# Patient Record
Sex: MALE | Race: WHITE | Employment: FULL TIME | URBAN - METROPOLITAN AREA
[De-identification: names, ages, dates, MRNs, and addresses within clinical notes are randomized per-mention and may not be internally consistent; named-entity substitution may affect disease eponyms.]

---

## 2021-12-17 ENCOUNTER — HOSPITAL ENCOUNTER (EMERGENCY)
Age: 36
Discharge: HOME | End: 2021-12-17
Attending: EMERGENCY MEDICINE
Payer: COMMERCIAL

## 2021-12-17 VITALS
TEMPERATURE: 98.3 F | WEIGHT: 220 LBS | RESPIRATION RATE: 18 BRPM | HEART RATE: 80 BPM | SYSTOLIC BLOOD PRESSURE: 123 MMHG | BODY MASS INDEX: 30.8 KG/M2 | HEIGHT: 71 IN | OXYGEN SATURATION: 100 % | DIASTOLIC BLOOD PRESSURE: 75 MMHG

## 2021-12-17 DIAGNOSIS — I47.1 SVT (SUPRAVENTRICULAR TACHYCARDIA) (HCC): Primary | ICD-10-CM

## 2021-12-17 LAB
ANION GAP SERPL CALCULATED.3IONS-SCNC: 12 MMOL/L (ref 7–16)
BASOPHILS ABSOLUTE: 0.03 E9/L (ref 0–0.2)
BASOPHILS RELATIVE PERCENT: 0.4 % (ref 0–2)
BUN BLDV-MCNC: 24 MG/DL (ref 6–20)
CALCIUM SERPL-MCNC: 9.4 MG/DL (ref 8.6–10.2)
CHLORIDE BLD-SCNC: 104 MMOL/L (ref 98–107)
CO2: 23 MMOL/L (ref 22–29)
CREAT SERPL-MCNC: 1.1 MG/DL (ref 0.7–1.2)
EKG ATRIAL RATE: 182 BPM
EKG Q-T INTERVAL: 252 MS
EKG QRS DURATION: 86 MS
EKG QTC CALCULATION (BAZETT): 441 MS
EKG R AXIS: 59 DEGREES
EKG T AXIS: 32 DEGREES
EKG VENTRICULAR RATE: 184 BPM
EOSINOPHILS ABSOLUTE: 0.11 E9/L (ref 0.05–0.5)
EOSINOPHILS RELATIVE PERCENT: 1.6 % (ref 0–6)
GFR AFRICAN AMERICAN: >60
GFR NON-AFRICAN AMERICAN: >60 ML/MIN/1.73
GLUCOSE BLD-MCNC: 133 MG/DL (ref 74–99)
HCT VFR BLD CALC: 50.3 % (ref 37–54)
HEMOGLOBIN: 17.6 G/DL (ref 12.5–16.5)
IMMATURE GRANULOCYTES #: 0.01 E9/L
IMMATURE GRANULOCYTES %: 0.1 % (ref 0–5)
LYMPHOCYTES ABSOLUTE: 2.02 E9/L (ref 1.5–4)
LYMPHOCYTES RELATIVE PERCENT: 28.6 % (ref 20–42)
MCH RBC QN AUTO: 30.2 PG (ref 26–35)
MCHC RBC AUTO-ENTMCNC: 35 % (ref 32–34.5)
MCV RBC AUTO: 86.4 FL (ref 80–99.9)
MONOCYTES ABSOLUTE: 0.45 E9/L (ref 0.1–0.95)
MONOCYTES RELATIVE PERCENT: 6.4 % (ref 2–12)
NEUTROPHILS ABSOLUTE: 4.45 E9/L (ref 1.8–7.3)
NEUTROPHILS RELATIVE PERCENT: 62.9 % (ref 43–80)
PDW BLD-RTO: 11.5 FL (ref 11.5–15)
PLATELET # BLD: 373 E9/L (ref 130–450)
PMV BLD AUTO: 9.5 FL (ref 7–12)
POTASSIUM REFLEX MAGNESIUM: 4.6 MMOL/L (ref 3.5–5)
RBC # BLD: 5.82 E12/L (ref 3.8–5.8)
SODIUM BLD-SCNC: 139 MMOL/L (ref 132–146)
TSH SERPL DL<=0.05 MIU/L-ACNC: 0.68 UIU/ML (ref 0.27–4.2)
WBC # BLD: 7.1 E9/L (ref 4.5–11.5)

## 2021-12-17 PROCEDURE — 80048 BASIC METABOLIC PNL TOTAL CA: CPT

## 2021-12-17 PROCEDURE — 96374 THER/PROPH/DIAG INJ IV PUSH: CPT

## 2021-12-17 PROCEDURE — 84443 ASSAY THYROID STIM HORMONE: CPT

## 2021-12-17 PROCEDURE — 96368 THER/DIAG CONCURRENT INF: CPT

## 2021-12-17 PROCEDURE — 96375 TX/PRO/DX INJ NEW DRUG ADDON: CPT

## 2021-12-17 PROCEDURE — 93010 ELECTROCARDIOGRAM REPORT: CPT | Performed by: INTERNAL MEDICINE

## 2021-12-17 PROCEDURE — 99284 EMERGENCY DEPT VISIT MOD MDM: CPT

## 2021-12-17 PROCEDURE — 6360000002 HC RX W HCPCS

## 2021-12-17 PROCEDURE — 93005 ELECTROCARDIOGRAM TRACING: CPT | Performed by: EMERGENCY MEDICINE

## 2021-12-17 PROCEDURE — 85025 COMPLETE CBC W/AUTO DIFF WBC: CPT

## 2021-12-17 PROCEDURE — 2500000003 HC RX 250 WO HCPCS

## 2021-12-17 RX ORDER — METOPROLOL TARTRATE 5 MG/5ML
2.5 INJECTION INTRAVENOUS ONCE
Status: COMPLETED | OUTPATIENT
Start: 2021-12-17 | End: 2021-12-17

## 2021-12-17 RX ORDER — ADENOSINE 3 MG/ML
12 INJECTION, SOLUTION INTRAVENOUS ONCE
Status: COMPLETED | OUTPATIENT
Start: 2021-12-17 | End: 2021-12-17

## 2021-12-17 RX ORDER — ADENOSINE 3 MG/ML
INJECTION, SOLUTION INTRAVENOUS
Status: COMPLETED
Start: 2021-12-17 | End: 2021-12-17

## 2021-12-17 RX ADMIN — METOPROLOL TARTRATE 2.5 MG: 1 INJECTION, SOLUTION INTRAVENOUS at 11:52

## 2021-12-17 RX ADMIN — ADENOSINE 12 MG: 3 INJECTION, SOLUTION INTRAVENOUS at 11:16

## 2021-12-17 RX ADMIN — ADENOSINE 12 MG: 3 INJECTION INTRAVENOUS at 11:16

## 2021-12-17 ASSESSMENT — ENCOUNTER SYMPTOMS
VOMITING: 0
EYE ITCHING: 0
EYE REDNESS: 0
CONSTIPATION: 0
TROUBLE SWALLOWING: 0
CHEST TIGHTNESS: 0
RHINORRHEA: 0
DIARRHEA: 0
ABDOMINAL PAIN: 0
BACK PAIN: 0
ABDOMINAL DISTENTION: 0
WHEEZING: 0
NAUSEA: 0
SHORTNESS OF BREATH: 0

## 2021-12-17 NOTE — ED PROVIDER NOTES
Modesto Hutton is a 31 y.o. male    Chief Complaint   Patient presents with    Tachycardia     pt having high HR at home for the past 45 minutes         HPI   Modesto Hutton is a 31 y.o. male presenting to the ED for Tachycardia (pt having high HR at home for the past 45 minutes)    Patient presents from home with palpitations/tachycardia above 180s for the last 45 minutes prior to arrival.  Patient has never had this before and has no significant medical history. No known causative factors. Patient is not currently having any chest pain, shortness of breath, nausea, vomiting, fever. 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 Patient does smoke a pack of cigarettes a day. Symptoms began 45 minutes prior to arrival with severe severity, and is constant no other associated symptoms or modifying factors. History comes primarily from the patient. On arrival, the patient was assessed by history, physical exam, laboratory studies and ekg, vital signs. Vital signs tachycardia but otherwise normal and the patient was afebrile. Review of Systems   Constitutional: Negative for appetite change, fatigue and fever. HENT: Negative for congestion, rhinorrhea and trouble swallowing. Eyes: Negative for redness and itching. Respiratory: Negative for chest tightness, shortness of breath and wheezing. Cardiovascular: Positive for palpitations. Negative for chest pain and leg swelling. Gastrointestinal: Negative for abdominal distention, abdominal pain, constipation, diarrhea, nausea and vomiting. Genitourinary: Negative for decreased urine volume, difficulty urinating and frequency. Musculoskeletal: Negative for arthralgias, back pain and myalgias. Neurological: Negative for dizziness, syncope, weakness, numbness and headaches. Psychiatric/Behavioral: Negative for agitation, behavioral problems, confusion and decreased concentration.  The patient is not nervous/anxious. All other systems reviewed and are negative. Physical Exam  Vitals reviewed. Constitutional:       General: He is not in acute distress. Appearance: Normal appearance. He is obese. He is not ill-appearing. HENT:      Head: Normocephalic and atraumatic. Right Ear: External ear normal.      Left Ear: External ear normal.      Nose: Nose normal. No congestion or rhinorrhea. Mouth/Throat:      Mouth: Mucous membranes are moist.      Pharynx: Oropharynx is clear. No oropharyngeal exudate or posterior oropharyngeal erythema. Eyes:      Extraocular Movements: Extraocular movements intact. Conjunctiva/sclera: Conjunctivae normal.      Pupils: Pupils are equal, round, and reactive to light. Cardiovascular:      Rate and Rhythm: Regular rhythm. Tachycardia present. Heart sounds: Normal heart sounds. No murmur heard. Pulmonary:      Effort: Pulmonary effort is normal. No respiratory distress. Breath sounds: Normal breath sounds. No wheezing or rhonchi. Abdominal:      General: Abdomen is flat. There is no distension. Tenderness: There is no abdominal tenderness. There is no guarding. Musculoskeletal:         General: No swelling or tenderness. Normal range of motion. Cervical back: Normal range of motion. No rigidity or tenderness. Skin:     General: Skin is warm and dry. Capillary Refill: Capillary refill takes less than 2 seconds. Coloration: Skin is not jaundiced or pale. Findings: No bruising or erythema. Neurological:      General: No focal deficit present. Mental Status: He is alert and oriented to person, place, and time. Cranial Nerves: No cranial nerve deficit. Motor: No weakness. Psychiatric:         Mood and Affect: Mood normal.         Behavior: Behavior normal.         Thought Content:  Thought content normal.          Procedures     MDM   Patient presented to the Emergency Department for orders placed or performed during the hospital encounter of 12/17/21   CBC Auto Differential   Result Value Ref Range    WBC 7.1 4.5 - 11.5 E9/L    RBC 5.82 (H) 3.80 - 5.80 E12/L    Hemoglobin 17.6 (H) 12.5 - 16.5 g/dL    Hematocrit 50.3 37.0 - 54.0 %    MCV 86.4 80.0 - 99.9 fL    MCH 30.2 26.0 - 35.0 pg    MCHC 35.0 (H) 32.0 - 34.5 %    RDW 11.5 11.5 - 15.0 fL    Platelets 146 100 - 451 E9/L    MPV 9.5 7.0 - 12.0 fL    Neutrophils % 62.9 43.0 - 80.0 %    Immature Granulocytes % 0.1 0.0 - 5.0 %    Lymphocytes % 28.6 20.0 - 42.0 %    Monocytes % 6.4 2.0 - 12.0 %    Eosinophils % 1.6 0.0 - 6.0 %    Basophils % 0.4 0.0 - 2.0 %    Neutrophils Absolute 4.45 1.80 - 7.30 E9/L    Immature Granulocytes # 0.01 E9/L    Lymphocytes Absolute 2.02 1.50 - 4.00 E9/L    Monocytes Absolute 0.45 0.10 - 0.95 E9/L    Eosinophils Absolute 0.11 0.05 - 0.50 E9/L    Basophils Absolute 0.03 0.00 - 0.20 P3/T   Basic Metabolic Panel w/ Reflex to MG   Result Value Ref Range    Sodium 139 132 - 146 mmol/L    Potassium reflex Magnesium 4.6 3.5 - 5.0 mmol/L    Chloride 104 98 - 107 mmol/L    CO2 23 22 - 29 mmol/L    Anion Gap 12 7 - 16 mmol/L    Glucose 133 (H) 74 - 99 mg/dL    BUN 24 (H) 6 - 20 mg/dL    CREATININE 1.1 0.7 - 1.2 mg/dL    GFR Non-African American >60 >=60 mL/min/1.73    GFR African American >60     Calcium 9.4 8.6 - 10.2 mg/dL   TSH without Reflex   Result Value Ref Range    TSH 0.678 0.270 - 4.200 uIU/mL   EKG 12 Lead   Result Value Ref Range    Ventricular Rate 184 BPM    Atrial Rate 182 BPM    QRS Duration 86 ms    Q-T Interval 252 ms    QTc Calculation (Bazett) 441 ms    R Axis 59 degrees    T Axis 32 degrees       Radiology:  No orders to display       ------------------------- NURSING NOTES AND VITALS REVIEWED ---------------------------  Date / Time Roomed:  12/17/2021 11:06 AM  ED Bed Assignment:  [id number]    The nursing notes within the ED encounter and vital signs as below have been reviewed.    /75   Pulse 80 Temp 98.3 °F (36.8 °C)   Resp 18   Ht 5' 11\" (1.803 m)   Wt 220 lb (99.8 kg)   SpO2 100%   BMI 30.68 kg/m²   Oxygen Saturation Interpretation: Normal      ------------------------------------------ PROGRESS NOTES ------------------------------------------  4:30 PM EST  I have spoken with the patient and discussed todays results, in addition to providing specific details for the plan of care and counseling regarding the diagnosis and prognosis. Their questions are answered at this time and they are agreeable with the plan. I discussed at length with them reasons for immediate return here for re evaluation. They will followup with their Cardiologist and primary care physician by calling their office tomorrow. --------------------------------- ADDITIONAL PROVIDER NOTES ---------------------------------  At this time the patient is without objective evidence of an acute process requiring hospitalization or inpatient management. They have remained hemodynamically stable throughout their entire ED visit and are stable for discharge with outpatient follow-up. The plan has been discussed in detail and they are aware of the specific conditions for emergent return, as well as the importance of follow-up. There are no discharge medications for this patient. Diagnosis:  1. SVT (supraventricular tachycardia) ([facility])        Disposition:  Patient's disposition: Discharge to home  Patient's condition is stable.          Jessica Wolf MD  Resident  12/17/21 3044

## 2022-03-09 ENCOUNTER — OFFICE VISIT (OUTPATIENT)
Dept: NON INVASIVE DIAGNOSTICS | Age: 37
End: 2022-03-09
Payer: COMMERCIAL

## 2022-03-09 VITALS
HEART RATE: 73 BPM | BODY MASS INDEX: 32.34 KG/M2 | DIASTOLIC BLOOD PRESSURE: 84 MMHG | RESPIRATION RATE: 16 BRPM | SYSTOLIC BLOOD PRESSURE: 132 MMHG | WEIGHT: 231 LBS | HEIGHT: 71 IN

## 2022-03-09 DIAGNOSIS — R94.31 EKG ABNORMALITIES: ICD-10-CM

## 2022-03-09 DIAGNOSIS — I47.1 SVT (SUPRAVENTRICULAR TACHYCARDIA) (HCC): Primary | ICD-10-CM

## 2022-03-09 PROCEDURE — G8427 DOCREV CUR MEDS BY ELIG CLIN: HCPCS | Performed by: INTERNAL MEDICINE

## 2022-03-09 PROCEDURE — 93000 ELECTROCARDIOGRAM COMPLETE: CPT | Performed by: INTERNAL MEDICINE

## 2022-03-09 PROCEDURE — 99205 OFFICE O/P NEW HI 60 MIN: CPT | Performed by: INTERNAL MEDICINE

## 2022-03-09 PROCEDURE — G8484 FLU IMMUNIZE NO ADMIN: HCPCS | Performed by: INTERNAL MEDICINE

## 2022-03-09 PROCEDURE — G8417 CALC BMI ABV UP PARAM F/U: HCPCS | Performed by: INTERNAL MEDICINE

## 2022-03-09 PROCEDURE — 1036F TOBACCO NON-USER: CPT | Performed by: INTERNAL MEDICINE

## 2022-03-09 NOTE — PROGRESS NOTES
[address] [facility] and [address] [facility] Electrophysiology  Consultation Report  PATIENT: Chandu Davila RECORD NUMBER: <[id number]>  DATE OF SERVICE:  3/9/2022  ATTENDING ELECTROPHYSIOLOGIST: Viraj Garcia MD  REFERRING PHYSICIAN: Wyatt Mixon MD and Jaqueline Pruitt MD  CHIEF COMPLAINT: SVTs    HPI: This is a 30 y.o. male with a history of obesity who presents to cardiac electrophysiology clinic for consultation of SVTs. The patient reports palpitations on 12/21/21. 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 He went to ER and was noted to be in SVT in ED and was given IV Adenosine which converted the rhythm back to normal sinus. He reports since being d/c'd from the hospital, he has not had anymore episodes of palpitations. He goes to report only having one episode of palpitations a year prior to his ER visit and the episode did not last long. He presents today in SR. The patient denies any chest pain, dyspnea, palpitations, dizziness, syncope, orthopnea or paroxysmal nocturnal dyspnea. The patient denies any family history of SCD. He reports drinking about 1 cup of coffee day as using energy drink. We discussed about further investigation with echocardiogram and cardiac monitor. Due to the episode is infrequent, we discussed regarding continue to observe recurrent without ant treatment and life style adjustments. If SVT recurs in future, we discussed regarding rhythm control options including medication versus EP study +/- ablation, explaining the risks and benefits as outlined below. There are no problems to display for this patient.       Past Medical History:   Diagnosis Date    SVT (supraventricular tachycardia) (HCC)        Family History   Problem Relation Age of Onset    Diabetes Father        Social History     Tobacco Use    Smoking status: Former Smoker     Packs/day: 0.25     Years: 14.00     Pack years: 3.50 Types: Cigarettes     Quit date: 3/9/2020     Years since quittin.0    Smokeless tobacco: Never Used   Substance Use Topics    Alcohol use: Yes     Comment: occasional       No current outpatient medications on file. No current facility-administered medications for this visit. No Known Allergies    ROS:   Constitutional: Negative for fever, activity change and appetite change. HENT: Negative for epistaxis. Eyes: Negative for diploplia, blurred vision. Respiratory: Negative for cough, chest tightness, shortness of breath and wheezing. Cardiovascular: pertinent positives in HPI  Gastrointestinal: Negative for abdominal pain and blood in stool. All other review of systems are negative     PHYSICAL EXAM:   Vitals:    22 1357   BP: 132/84   Pulse: 73   Resp: 16   Weight: 231 lb (104.8 kg)   Height: 5' 11\" (1.803 m)      Constitutional: Well-developed, no acute distress  Eyes: conjunctivae normal, no xanthelasma   Ears, Nose, Throat: oral mucosa moist, no cyanosis   CV: no JVD. Regular rate and rhythm. Normal S1S2 and no S3. No murmurs, rubs, or gallops. PMI is nondisplaced  Lungs: clear to auscultation bilaterally, normal respiratory effort without used of accessory muscles  Abdomen: soft, non-tender, bowel sounds present, no masses or hepatomegaly   Musculoskeletal: no digital clubbing, no edema   Skin: warm, no rashes     I have personally reviewed the laboratory, cardiac diagnostic and radiographic testing as outlined below:    Data:    No results for input(s): WBC, HGB, HCT, PLT in the last 72 hours. No results for input(s): NA, K, CL, CO2, BUN, CREATININE, GLU, CALCIUM in the last 72 hours. Invalid input(s): MAGNESIUM   No results found for: MG  No results for input(s): TSH in the last 72 hours. No results for input(s): INR in the last 72 hours. EKG: 3/9/22: SR, rate: 73 bpm, no delta wave, QTc 378 ms. - Please see scan in Cardiology.     EKG 21:      I have independently reviewed all of the ECGs and rhythm strips per above     Assessment/Plan: This is a 30 y.o. male with a history of obesity who presents with SVT    1. Supraventricular tachycardia  - Diagnosed 12/17/21.  - NCT with short RP.  - Terminated with IV adenosine.  - History of failed vagal maneuvers. Vagal maneuver reviewed. - Due to the episode is infrequent, we discussed regarding continue to observe recurrent without ant treatment and life style adjustments. -  If SVT recurs in future, we discussed regarding rhythm control options including medication versus EP study +/- ablation, explaining the risks and benefits as outlined below. - I explained the pathophysiology of this condition, as well as the possibilities of AVNRT(60%),  AVRT(30%) or atrial tachycardia (10%) as the etiology of the patient's arrhythmias. The risks, benefits, and alternatives to EP study and catheter ablation were reviewed in detail today, including bleeding, blood clot, infection, vascular injury required surgical repair, a low but definite risk of AV block requiring permanent pacemaker implantation with AVNRT ablation, and heart attack, stroke, bleeding, and death associated with left atrial ablation. 2. Obesity  Body mass index is 32.22 kg/m². Recommendations:  1. Fourteen day cardiac monitor to document SVT burden - will call with results. 2. Echocardiogram to exclude structural heart disease - will call with results. 3. The patient to consider medical therapy versus RF ablation if SVT recurs. 4. Caffeine. Energy drink and ETOH avoidance. 5. Follow up in 3 months or sooner PRN. Encouraged the patient to call the office for any questions or concerns. I have spent a total of 60 minutes with the patient and the family reviewing the above stated recommendations.   And a total of >50% of that time involved face-to-face time providing counseling and or coordination of care with the other providers, preparation for the clinic visit, reviewing records/tests, counseling/education of the patient, ordering medications/tests/procedures, coordinating care, and documenting clinical information in the EHR. Thank you for allowing me to participate in your patient's care. Please call me if there are any questions or concerns.       Kirstin Harper MD  Cardiac Electrophysiology  [facility]  [facility] [place]: [facility]  2:20 PM  3/9/2022

## 2022-03-09 NOTE — PATIENT INSTRUCTIONS
Vagal maneuvers that you can try to slow your fast heart rate include:  Holding your breath and bearing down (Valsalva maneuver). Immersing your face in ice-cold water (diving reflex). [address] [facility] [place].

## 2022-03-09 NOTE — PROGRESS NOTES
Patient was seen in Office today to have 14 day zio xt monitor put on per Dr. Jeison Varela. Pt tolerated placement and understood use and return of device number [id number].     Electronically signed by Fidel Monroy MA on 3/9/2022 at 2:47 PM

## 2022-04-04 ENCOUNTER — TELEPHONE (OUTPATIENT)
Dept: NON INVASIVE DIAGNOSTICS | Age: 37
End: 2022-04-04

## 2022-04-04 DIAGNOSIS — I47.1 SVT (SUPRAVENTRICULAR TACHYCARDIA) (HCC): ICD-10-CM

## 2022-04-04 NOTE — TELEPHONE ENCOUNTER
----- Message from Zulay Gates MD sent at 4/4/2022 10:58 AM EDT -----  No SVT seen.  Thanks.  ----- Message -----  From: Jessa Ramirez MA  Sent: 4/4/2022   8:26 AM EDT  To: Zulay Gates MD

## 2022-05-15 ENCOUNTER — HOSPITAL ENCOUNTER (EMERGENCY)
Age: 37
Discharge: HOME | End: 2022-05-15
Attending: EMERGENCY MEDICINE
Payer: COMMERCIAL

## 2022-05-15 ENCOUNTER — APPOINTMENT (OUTPATIENT)
Dept: GENERAL RADIOLOGY | Age: 37
End: 2022-05-15
Payer: COMMERCIAL

## 2022-05-15 VITALS
SYSTOLIC BLOOD PRESSURE: 113 MMHG | OXYGEN SATURATION: 96 % | RESPIRATION RATE: 16 BRPM | DIASTOLIC BLOOD PRESSURE: 70 MMHG | HEART RATE: 90 BPM

## 2022-05-15 DIAGNOSIS — I47.1 PAROXYSMAL SUPRAVENTRICULAR TACHYCARDIA (HCC): Primary | ICD-10-CM

## 2022-05-15 LAB
ANION GAP SERPL CALCULATED.3IONS-SCNC: 12 MMOL/L (ref 7–16)
BUN BLDV-MCNC: 21 MG/DL (ref 6–20)
CALCIUM SERPL-MCNC: 8.8 MG/DL (ref 8.6–10.2)
CHLORIDE BLD-SCNC: 104 MMOL/L (ref 98–107)
CO2: 22 MMOL/L (ref 22–29)
CREAT SERPL-MCNC: 0.9 MG/DL (ref 0.7–1.2)
GFR AFRICAN AMERICAN: >60
GFR NON-AFRICAN AMERICAN: >60 ML/MIN/1.73
GLUCOSE BLD-MCNC: 102 MG/DL (ref 74–99)
HCT VFR BLD CALC: 44.6 % (ref 37–54)
HEMOGLOBIN: 15.5 G/DL (ref 12.5–16.5)
MAGNESIUM: 1.8 MG/DL (ref 1.6–2.6)
MCH RBC QN AUTO: 30.4 PG (ref 26–35)
MCHC RBC AUTO-ENTMCNC: 34.8 % (ref 32–34.5)
MCV RBC AUTO: 87.5 FL (ref 80–99.9)
PDW BLD-RTO: 11.8 FL (ref 11.5–15)
PLATELET # BLD: 296 E9/L (ref 130–450)
PMV BLD AUTO: 9.1 FL (ref 7–12)
POTASSIUM SERPL-SCNC: 3.8 MMOL/L (ref 3.5–5)
RBC # BLD: 5.1 E12/L (ref 3.8–5.8)
SODIUM BLD-SCNC: 138 MMOL/L (ref 132–146)
TROPONIN, HIGH SENSITIVITY: <6 NG/L (ref 0–11)
TSH SERPL DL<=0.05 MIU/L-ACNC: 1.24 UIU/ML (ref 0.27–4.2)
WBC # BLD: 6.6 E9/L (ref 4.5–11.5)

## 2022-05-15 PROCEDURE — 84484 ASSAY OF TROPONIN QUANT: CPT

## 2022-05-15 PROCEDURE — 85027 COMPLETE CBC AUTOMATED: CPT

## 2022-05-15 PROCEDURE — 93005 ELECTROCARDIOGRAM TRACING: CPT | Performed by: EMERGENCY MEDICINE

## 2022-05-15 PROCEDURE — 80048 BASIC METABOLIC PNL TOTAL CA: CPT

## 2022-05-15 PROCEDURE — 2580000003 HC RX 258: Performed by: EMERGENCY MEDICINE

## 2022-05-15 PROCEDURE — 99285 EMERGENCY DEPT VISIT HI MDM: CPT

## 2022-05-15 PROCEDURE — 71045 X-RAY EXAM CHEST 1 VIEW: CPT

## 2022-05-15 PROCEDURE — 84443 ASSAY THYROID STIM HORMONE: CPT

## 2022-05-15 PROCEDURE — 83735 ASSAY OF MAGNESIUM: CPT

## 2022-05-15 RX ORDER — ADENOSINE 3 MG/ML
12 INJECTION, SOLUTION INTRAVENOUS ONCE
Status: DISCONTINUED | OUTPATIENT
Start: 2022-05-15 | End: 2022-05-15 | Stop reason: HOSPADM

## 2022-05-15 RX ORDER — ADENOSINE 3 MG/ML
INJECTION, SOLUTION INTRAVENOUS
Status: DISCONTINUED
Start: 2022-05-15 | End: 2022-05-15 | Stop reason: HOSPADM

## 2022-05-15 RX ORDER — 0.9 % SODIUM CHLORIDE 0.9 %
1000 INTRAVENOUS SOLUTION INTRAVENOUS ONCE
Status: COMPLETED | OUTPATIENT
Start: 2022-05-15 | End: 2022-05-15

## 2022-05-15 RX ADMIN — SODIUM CHLORIDE 1000 ML: 9 INJECTION, SOLUTION INTRAVENOUS at 10:50

## 2022-05-15 ASSESSMENT — PAIN - FUNCTIONAL ASSESSMENT: PAIN_FUNCTIONAL_ASSESSMENT: NONE - DENIES PAIN

## 2022-05-15 NOTE — ED PROVIDER NOTES
HPI   Patient is a 30 y.o. male with a past medical history of SVT, presenting to the Emergency Department for tachycardia. History obtained by patient. Symptoms are moderate to severe in severity and persistent since onset. They are improved by nothing and worsened by nothing. Patient presents for tachycardia. He states he felt like his heart was racing today. He does have a history of SVT. He states his heart rate was in the 200s while he was at work and presented for eval.  He denies any pain in his chest.  He denies any shortness of breath. He does admit to drinking alcohol yesterday and thinks he might be a little dehydrated. He denies any pain in his chest.  He denies any blurry vision, changes in vision, numbness, tingling or weakness. Review of Systems   Constitutional: Negative for chills and fever. HENT: Negative for congestion, ear pain and sore throat. Eyes: Negative for pain and visual disturbance. Respiratory: Negative for cough and shortness of breath. Cardiovascular: Positive for palpitations. Negative for chest pain. Gastrointestinal: Negative for abdominal pain, diarrhea, nausea and vomiting. Genitourinary: Negative for dysuria and frequency. Musculoskeletal: Negative for arthralgias and back pain. Skin: Negative for rash and wound. Neurological: Positive for light-headedness. Negative for weakness and headaches. All other systems reviewed and are negative. Physical Exam  Vitals and nursing note reviewed. Constitutional:       General: He is not in acute distress. Appearance: Normal appearance. He is well-developed. He is not ill-appearing. HENT:      Head: Normocephalic and atraumatic. Eyes:      Extraocular Movements: Extraocular movements intact. Cardiovascular:      Rate and Rhythm: Regular rhythm. Tachycardia present. Pulses: Normal pulses. Heart sounds: Normal heart sounds. No murmur heard.       Pulmonary:      Effort: Pulmonary effort is normal. No respiratory distress. Breath sounds: Normal breath sounds. No wheezing or rales. Abdominal:      Palpations: Abdomen is soft. Tenderness: There is no abdominal tenderness. There is no guarding or rebound. Musculoskeletal:      Cervical back: Normal range of motion and neck supple. Skin:     General: Skin is warm and dry. Capillary Refill: Capillary refill takes less than 2 seconds. Neurological:      Mental Status: He is alert and oriented to person, place, and time. Cranial Nerves: No cranial nerve deficit. Coordination: Coordination normal.          Procedures     MDM   Patient presented to the Emergency Department for palpitations. Tachycardic on arrival in [facility] clinically and on the monitor. Heart rate in the 190s. Blood pressure 97 systolic. Labs and EKG ordered. Patient on the monitor. Attempted vagal maneuvers in the room which did convert patient to sinus rhythm. Heart rate improving. Palpitations and all symptoms resolved after vagal maneuvers. Asymptomatic. Labs reassuring. EKG performed demonstrating sinus rhythm with controlled heart rate. Symptoms most likely secondary to paroxysmal SVT. Already follows with electrophysiology. Consideration of beta-blocker for outpatient. Patient thinks his baseline and resting heart rate is in the 60s. Will give prescription for beta-blocker but instructed to call electrophysiologist in the morning to discuss use as low resting HR. Educated patient on symptoms, diagnosis and supportive care. Follow-up with electrophysiology and call in the morning. Strict return precautions were discussed including but not limited too chest pain, shortness of breath, dyspnea, return of symptoms,, new or worsening symtpoms. They verbalized understanding and were agreeable with the plan. All questions were answered and patient was discharged.     ED Course as of 05/16/22 1015   Sun May 15, 2022   1049 Evaluated patient. Heart rate 197 with blood pressure of 97/72. SVT on the monitor. Attempted vagal maneuver in the room the patient on the monitor and subsequently converted to sinus rhythm. Adenosine did not be given. Repeat blood pressure 137/80. Patient asymptomatic [KP]      ED Course User Index  [KP] Natali Hess DO          --------------------------------------------- PAST HISTORY ---------------------------------------------  Past Medical History:  has a past medical history of SVT (supraventricular tachycardia) ([facility] [address] Past Surgical History:  has a past surgical history that includes knee surgery (Left, 2017). Social History:  reports that he quit smoking about 2 years ago. His smoking use included cigarettes. He has a 3.50 pack-year smoking history. He has never used smokeless tobacco. He reports current alcohol use. He reports that he does not use drugs. Family History: family history includes Diabetes in his father. The patients home medications have been reviewed. Allergies: Patient has no known allergies.     -------------------------------------------------- RESULTS -------------------------------------------------  Labs:  Results for orders placed or performed during the hospital encounter of 05/15/22   CBC   Result Value Ref Range    WBC 6.6 4.5 - 11.5 E9/L    RBC 5.10 3.80 - 5.80 E12/L    Hemoglobin 15.5 12.5 - 16.5 g/dL    Hematocrit 44.6 37.0 - 54.0 %    MCV 87.5 80.0 - 99.9 fL    MCH 30.4 26.0 - 35.0 pg    MCHC 34.8 (H) 32.0 - 34.5 %    RDW 11.8 11.5 - 15.0 fL    Platelets 467 680 - 464 E9/L    MPV 9.1 7.0 - 12.0 fL   Troponin   Result Value Ref Range    Troponin, High Sensitivity <6 0 - 11 ng/L   Basic Metabolic Panel   Result Value Ref Range    Sodium 138 132 - 146 mmol/L    Potassium 3.8 3.5 - 5.0 mmol/L    Chloride 104 98 - 107 mmol/L    CO2 22 22 - 29 mmol/L    Anion Gap 12 7 - 16 mmol/L    Glucose 102 (H) 74 - 99 mg/dL    BUN 21 (H) 6 - 20 mg/dL    CREATININE 0.9 0.7 - 1.2 mg/dL    GFR Non-African American >60 >=60 mL/min/1.73    GFR African American >60     Calcium 8.8 8.6 - 10.2 mg/dL   Magnesium   Result Value Ref Range    Magnesium 1.8 1.6 - 2.6 mg/dL   TSH   Result Value Ref Range    TSH 1.240 0.270 - 4.200 uIU/mL   EKG 12 Lead   Result Value Ref Range    Ventricular Rate 107 BPM    Atrial Rate 107 BPM    P-R Interval 158 ms    QRS Duration 82 ms    Q-T Interval 348 ms    QTc Calculation (Bazett) 464 ms    P Axis 55 degrees    R Axis 46 degrees    T Axis 31 degrees       Radiology:  XR CHEST PORTABLE   Final Result   No acute cardiopulmonary process             EKG:  This EKG is signed and interpreted by ED Physician. Time:  1046   Rate: 107  Rhythm: Sinus. Interpretation: non-specific EKG. PVC. Normal axis. No stemi. qtc 464  Comparison: changes compared to previous EKG.      ------------------------- NURSING NOTES AND VITALS REVIEWED ---------------------------  Date / Time Roomed:  5/15/2022 10:35 AM  ED Bed Assignment:  07/07    The nursing notes within the ED encounter and vital signs as below have been reviewed. /70   Pulse 90   Resp 16   SpO2 96%   Oxygen Saturation Interpretation: Normal      ------------------------------------------ PROGRESS NOTES ------------------------------------------  ED COURSE MEDICATIONS:                Medications   0.9 % sodium chloride bolus (0 mLs IntraVENous Stopped 5/15/22 1154)       I have spoken with the patient and discussed todays results, in addition to providing specific details for the plan of care and counseling regarding the diagnosis and prognosis. Their questions are answered at this time and they are agreeable with the plan. I discussed at length with them reasons for immediate return here for re evaluation. They will followup with primary care by calling their office tomorrow.       --------------------------------- ADDITIONAL PROVIDER NOTES ---------------------------------  At this time the patient is without objective evidence of an acute process requiring hospitalization or inpatient management. They have remained hemodynamically stable throughout their entire ED visit and are stable for discharge with outpatient follow-up. The plan has been discussed in detail and they are aware of the specific conditions for emergent return, as well as the importance of follow-up. Discharge Medication List as of 5/15/2022 12:42 PM      START taking these medications    Details   metoprolol tartrate (LOPRESSOR) 25 MG tablet Take 0.5 tablets by mouth 2 times daily, Disp-30 tablet, R-0Print             Diagnosis:  1. Paroxysmal supraventricular tachycardia (HCC)        Disposition:  Patient's disposition: Discharge to home  Patient's condition is stable.         Jose Potter,   Resident  05/16/22 1015

## 2022-05-15 NOTE — Clinical Note
Mana Hadley was seen and treated in our emergency department on 5/15/2022. He may return to work on 05/15/2022. If you have any questions or concerns, please don't hesitate to call.       Natali Hess, DO

## 2022-05-16 LAB
EKG ATRIAL RATE: 107 BPM
EKG P AXIS: 55 DEGREES
EKG P-R INTERVAL: 158 MS
EKG Q-T INTERVAL: 348 MS
EKG QRS DURATION: 82 MS
EKG QTC CALCULATION (BAZETT): 464 MS
EKG R AXIS: 46 DEGREES
EKG T AXIS: 31 DEGREES
EKG VENTRICULAR RATE: 107 BPM

## 2022-05-16 PROCEDURE — 93010 ELECTROCARDIOGRAM REPORT: CPT | Performed by: INTERNAL MEDICINE

## 2022-05-16 ASSESSMENT — ENCOUNTER SYMPTOMS
BACK PAIN: 0
SORE THROAT: 0
NAUSEA: 0
DIARRHEA: 0
VOMITING: 0
EYE PAIN: 0
ABDOMINAL PAIN: 0
SHORTNESS OF BREATH: 0
COUGH: 0

## 2022-05-17 ENCOUNTER — TELEPHONE (OUTPATIENT)
Dept: NON INVASIVE DIAGNOSTICS | Age: 37
End: 2022-05-17

## 2022-05-17 NOTE — TELEPHONE ENCOUNTER
Spoke with patient let him know CK recommendations. Patient verbalized understanding. Patient on list to see CK to discuss ablation.

## 2022-05-17 NOTE — TELEPHONE ENCOUNTER
----- Message from Sheba Hernandez MD sent at 5/17/2022 10:06 AM EDT -----  Sure. Option is EP study with RF ablation of SVT. Please also try to obtain EKG during SVT. I do not see it in Epic. Thanks.  ----- Message -----  From: Aurdey Ruby  Sent: 5/17/2022   9:30 AM EDT  To: Sheba Hernandez MD    Patient called in was in ED yesterday and they prescribed metoprolol. Patient would like to know if he should take medication?  Please advise, thank you

## 2022-05-27 NOTE — PROGRESS NOTES
[address] [facility],[address] Floor and [address] [facility] Electrophysiology  Outpatient Progress Report  PATIENT: Jose J Hairston  MEDICAL RECORD NUMBER: [id number]  DATE OF SERVICE:  5/31/2022  ATTENDING ELECTROPHYSIOLOGIST: Raysa Sifuentes MD  REFERRING PHYSICIAN: No ref. provider found and Bettie Eli MD  CHIEF COMPLAINT: SVTs    HPI: This is a 30 y.o. male with a history of obesity who presents to cardiac electrophysiology clinic for management of SVTs. Since his last office visit, Mr. Megan Palm wore a 14 day MCOT that showed no significant arrhythmias. Symptoms were correlated with SR and PVCs. He has not completed the TTE. He presented to the ER in May 2022 with palpitations and was noted to have HR of 190's bpm. No EKGs or rhythm strips were available for review. Vagal maneuver terminated the rhythm back to normal sinus. He was started on Lopressor. He reports feeling overall well. He presents today in SR. Again, we discussed regarding rhythm control options including medication versus EP study +/- ablation, explaining the risks and benefits as outlined below.      Patient Active Problem List    Diagnosis Date Noted    Disorder of male genital organs 05/31/2022     Priority: Medium    Gastroenteritis 05/31/2022     Priority: Medium    Insomnia 05/31/2022     Priority: Medium    Low back pain 05/31/2022     Priority: Medium    Nicotine dependence 05/31/2022     Priority: Medium    Pain in joint, lower leg 05/31/2022     Priority: Medium    Patellofemoral dysfunction 05/31/2022     Priority: Medium    Sprain, metatarsophalangeal joint 05/31/2022     Priority: Medium    Tinea cruris 05/31/2022     Priority: Medium    Upper respiratory infection 05/31/2022     Priority: Medium       Past Medical History:   Diagnosis Date    SVT (supraventricular tachycardia) ([facility] [address]        Family History   Problem Relation Age of Onset    Diabetes Father        Social History     Tobacco Use    Smoking status: Former Smoker     Packs/day: 0.25     Years: 14.00     Pack years: 3.50     Types: Cigarettes     Quit date: 3/9/2020     Years since quittin.2    Smokeless tobacco: Never Used   Substance Use Topics    Alcohol use: Yes     Comment: occasional       Current Outpatient Medications   Medication Sig Dispense Refill    metoprolol tartrate (LOPRESSOR) 25 MG tablet Take 0.5 tablets by mouth 2 times daily 30 tablet 0     No current facility-administered medications for this visit. No Known Allergies    ROS:   Constitutional: Negative for fever, activity change and appetite change. HENT: Negative for epistaxis. Eyes: Negative for diploplia, blurred vision. Respiratory: Negative for cough, chest tightness, shortness of breath and wheezing. Cardiovascular: pertinent positives in HPI  Gastrointestinal: Negative for abdominal pain and blood in stool. All other review of systems are negative     PHYSICAL EXAM:   Vitals:    22 0804   BP: 118/80   Site: Left Upper Arm   Position: Sitting   Cuff Size: Medium Adult   Pulse: 63   Resp: 18   Weight: 226 lb 12.8 oz (102.9 kg)   Height: 5' 11\" (1.803 m)      Constitutional: Well-developed, no acute distress  Eyes: conjunctivae normal, no xanthelasma   Ears, Nose, Throat: oral mucosa moist, no cyanosis   CV: no JVD. Regular rate and rhythm. Normal S1S2 and no S3. No murmurs, rubs, or gallops. PMI is nondisplaced  Lungs: clear to auscultation bilaterally, normal respiratory effort without used of accessory muscles  Abdomen: soft, non-tender, bowel sounds present, no masses or hepatomegaly   Musculoskeletal: no digital clubbing, no edema   Skin: warm, no rashes     I have personally reviewed the laboratory, cardiac diagnostic and radiographic testing as outlined below:    Data:    No results for input(s): WBC, HGB, HCT, PLT in the last 72 hours. No results for input(s): NA, K, CL, CO2, BUN, CREATININE, GLU, CALCIUM in the last 72 hours.     Invalid input(s): MAGNESIUM   Lab Results   Component Value Date    MG 1.8 05/15/2022     No results for input(s): TSH in the last 72 hours. No results for input(s): INR in the last 72 hours. EK22: SR, rate: 63bpm, no delta wave, QTc 403ms. - Please see scan in Cardiology. 14 day Zio XT: 3/9/2022      EKG 21:      I have independently reviewed all of the ECGs and rhythm strips per above     Assessment/Plan: This is a 30 y.o. male with a history of obesity who presents with SVT    1. Supraventricular tachycardia  - Diagnosed 21.  - NCT with short RP.  - History of failed vagal maneuvers. - Terminated with IV adenosine.  - Recurrent SVT on 5/15/22 which terminated with Vagal maneuver. - Vagal maneuver reviewed. -  We again discussed regarding rhythm control options including medication versus EP study +/- ablation, explaining the risks and benefits as outlined below. - I explained the pathophysiology of this condition, as well as the possibilities of AVNRT(60%),  AVRT(30%) or atrial tachycardia (10%) as the etiology of the patient's arrhythmias. The risks, benefits, and alternatives to EP study and catheter ablation were reviewed in detail today, including bleeding, blood clot, infection, vascular injury required surgical repair, a low but definite risk of AV block requiring permanent pacemaker implantation with AVNRT ablation, and heart attack, stroke, bleeding, and death associated with left atrial ablation. The patient verbalizes understanding and wishes to consider proceeding with the procedure. 2. Obesity  Body mass index is 31.63 kg/m². Recommendations:  1. Await for echocardiogram results, scheduled for 2022.  2. Change Lopressor to Toprol XL 25 mg daily. 3. The patient to consider EP study with RF ablation of SVT with Carto. 4. Hold Toprol XL for 3 days before the procedure. 5. Follow up after the procedure or in 3 months.  Encouraged the patient to call the office for any questions or concerns. I have spent a total of 40 minutes with the patient and the family reviewing the above stated recommendations. And a total of >50% of that time involved face-to-face time providing counseling and or coordination of care with the other providers, preparation for the clinic visit, reviewing records/tests, counseling/education of the patient, ordering medications/tests/procedures, coordinating care, and documenting clinical information in the EHR. Thank you for allowing me to participate in your patient's care. Please call me if there are any questions or concerns.       Zulay Gates MD  Cardiac Electrophysiology  [facility]  [facility] [place]: [facility]  5/31/22   8:17 AM

## 2022-05-31 ENCOUNTER — OFFICE VISIT (OUTPATIENT)
Dept: NON INVASIVE DIAGNOSTICS | Age: 37
End: 2022-05-31
Payer: COMMERCIAL

## 2022-05-31 VITALS
DIASTOLIC BLOOD PRESSURE: 80 MMHG | BODY MASS INDEX: 31.75 KG/M2 | SYSTOLIC BLOOD PRESSURE: 118 MMHG | HEART RATE: 63 BPM | RESPIRATION RATE: 18 BRPM | HEIGHT: 71 IN | WEIGHT: 226.8 LBS

## 2022-05-31 DIAGNOSIS — I47.1 SVT (SUPRAVENTRICULAR TACHYCARDIA) (HCC): Primary | ICD-10-CM

## 2022-05-31 PROBLEM — B35.6 TINEA CRURIS: Status: ACTIVE | Noted: 2022-05-31

## 2022-05-31 PROBLEM — S93.529A SPRAIN, METATARSOPHALANGEAL JOINT: Status: ACTIVE | Noted: 2022-05-31

## 2022-05-31 PROBLEM — F17.200 NICOTINE DEPENDENCE: Status: ACTIVE | Noted: 2022-05-31

## 2022-05-31 PROBLEM — M25.869 PATELLOFEMORAL DYSFUNCTION: Status: ACTIVE | Noted: 2022-05-31

## 2022-05-31 PROBLEM — G47.00 INSOMNIA: Status: ACTIVE | Noted: 2022-05-31

## 2022-05-31 PROBLEM — M25.569 PAIN IN JOINT, LOWER LEG: Status: ACTIVE | Noted: 2022-05-31

## 2022-05-31 PROBLEM — N50.9 DISORDER OF MALE GENITAL ORGANS: Status: ACTIVE | Noted: 2022-05-31

## 2022-05-31 PROBLEM — K52.9 GASTROENTERITIS: Status: ACTIVE | Noted: 2022-05-31

## 2022-05-31 PROBLEM — M54.50 LOW BACK PAIN: Status: ACTIVE | Noted: 2022-05-31

## 2022-05-31 PROBLEM — J06.9 UPPER RESPIRATORY INFECTION: Status: ACTIVE | Noted: 2022-05-31

## 2022-05-31 PROCEDURE — 93000 ELECTROCARDIOGRAM COMPLETE: CPT | Performed by: INTERNAL MEDICINE

## 2022-05-31 PROCEDURE — G8417 CALC BMI ABV UP PARAM F/U: HCPCS | Performed by: INTERNAL MEDICINE

## 2022-05-31 PROCEDURE — G8427 DOCREV CUR MEDS BY ELIG CLIN: HCPCS | Performed by: INTERNAL MEDICINE

## 2022-05-31 PROCEDURE — 1036F TOBACCO NON-USER: CPT | Performed by: INTERNAL MEDICINE

## 2022-05-31 PROCEDURE — 99215 OFFICE O/P EST HI 40 MIN: CPT | Performed by: INTERNAL MEDICINE

## 2022-05-31 RX ORDER — METOPROLOL SUCCINATE 25 MG/1
25 TABLET, EXTENDED RELEASE ORAL DAILY
Qty: 90 TABLET | Refills: 1 | Status: SHIPPED
Start: 2022-05-31 | End: 2022-09-29 | Stop reason: HOSPADM

## 2022-06-08 ENCOUNTER — HOSPITAL ENCOUNTER (OUTPATIENT)
Dept: CARDIOLOGY | Age: 37
Discharge: HOME | End: 2022-06-08
Payer: COMMERCIAL

## 2022-06-08 DIAGNOSIS — R94.31 EKG ABNORMALITIES: ICD-10-CM

## 2022-06-08 LAB
LV EF: 60 %
LVEF MODALITY: NORMAL

## 2022-06-08 PROCEDURE — 93307 TTE W/O DOPPLER COMPLETE: CPT

## 2022-06-08 PROCEDURE — 93306 TTE W/DOPPLER COMPLETE: CPT | Performed by: PSYCHIATRY & NEUROLOGY

## 2022-06-09 ENCOUNTER — TELEPHONE (OUTPATIENT)
Dept: NON INVASIVE DIAGNOSTICS | Age: 37
End: 2022-06-09

## 2022-06-09 NOTE — TELEPHONE ENCOUNTER
I spoke to [facility] and informed him that his echo is normal, and I asked if he wanted to schedule the ablation. He states he is not ready yet, he has a busy scheduled the next couple months. He requested a 3 mo f/u w/ [facility] which I scheduled for 9/7/22.

## 2022-06-09 NOTE — TELEPHONE ENCOUNTER
----- Message from Eddie Christianson MD sent at 6/8/2022  8:53 PM EDT -----  Echo is normal. Recommend EP study with RF ablation.  Thanks.  ----- Message -----  From: Sandy Verdin Incoming Cardiology Results From [facility]  Sent: 6/8/2022   6:58 PM EDT  To: Eddie Christianson MD

## 2022-09-06 NOTE — PROGRESS NOTES
[address] [facility],[address] Floor and [address] [facility] Electrophysiology  Outpatient Progress Report  PATIENT: Micki Butterfield  MEDICAL RECORD NUMBER: [id number]  DATE OF SERVICE:  2022  ATTENDING ELECTROPHYSIOLOGIST: Giovanni Leventhal, MD  REFERRING PHYSICIAN: No ref. provider found and Doris Cohn MD  CHIEF COMPLAINT: SVTs    HPI: This is a 30 y.o. male with a history of obesity who presents to cardiac electrophysiology clinic for management of SVTs. Since his last office visit, Mr. Shaheen Chandler underwent echocardiogram on 22 which showed normal LV function. He reports feeling overall well and denies any recurrent SVT episode. He presents today in SR. Again, we discussed regarding rhythm control options including continue medical therapy versus EP study +/- ablation, explaining the risks and benefits as outlined below. The patient denies any chest pain, dyspnea, palpitations, dizziness, syncope, orthopnea or paroxysmal nocturnal dyspnea.     Patient Active Problem List    Diagnosis Date Noted    Disorder of male genital organs 2022     Priority: Medium    Gastroenteritis 2022     Priority: Medium    Insomnia 2022     Priority: Medium    Low back pain 2022     Priority: Medium    Nicotine dependence 2022     Priority: Medium    Pain in joint, lower leg 2022     Priority: Medium    Patellofemoral dysfunction 2022     Priority: Medium    Sprain, metatarsophalangeal joint 2022     Priority: Medium    Tinea cruris 2022     Priority: Medium    Upper respiratory infection 2022     Priority: Medium       Past Medical History:   Diagnosis Date    SVT (supraventricular tachycardia) ([facility] [address]        Family History   Problem Relation Age of Onset    Diabetes Father        Social History     Tobacco Use    Smoking status: Former     Packs/day: 0.25     Years: 14.00     Pack years: 3.50     Types: Cigarettes     Quit date: 3/9/2020     Years since quittin.4 Smokeless tobacco: Never   Substance Use Topics    Alcohol use: Yes     Comment: occasional       Current Outpatient Medications   Medication Sig Dispense Refill    metoprolol succinate (TOPROL XL) 25 MG extended release tablet Take 1 tablet by mouth daily 90 tablet 1     No current facility-administered medications for this visit. No Known Allergies    ROS:   Constitutional: Negative for fever, activity change and appetite change. HENT: Negative for epistaxis. Eyes: Negative for diploplia, blurred vision. Respiratory: Negative for cough, chest tightness, shortness of breath and wheezing. Cardiovascular: pertinent positives in HPI  Gastrointestinal: Negative for abdominal pain and blood in stool. All other review of systems are negative     PHYSICAL EXAM:   Vitals:    22 0845   BP: 122/80   Pulse: (!) 48   Resp: 16   Weight: 232 lb 9.6 oz (105.5 kg)   Height: 5' 11\" (1.803 m)        Constitutional: Well-developed, no acute distress  Eyes: conjunctivae normal, no xanthelasma   Ears, Nose, Throat: oral mucosa moist, no cyanosis   CV: no JVD. Regular rate and rhythm. Normal S1S2 and no S3. No murmurs, rubs, or gallops. PMI is nondisplaced  Lungs: clear to auscultation bilaterally, normal respiratory effort without used of accessory muscles  Abdomen: soft, non-tender, bowel sounds present, no masses or hepatomegaly   Musculoskeletal: no digital clubbing, no edema   Skin: warm, no rashes     I have personally reviewed the laboratory, cardiac diagnostic and radiographic testing as outlined below:    Data:    No results for input(s): WBC, HGB, HCT, PLT in the last 72 hours. No results for input(s): NA, K, CL, CO2, BUN, CREATININE, GLU, CALCIUM in the last 72 hours. Invalid input(s): MAGNESIUM   Lab Results   Component Value Date/Time    MG 1.8 05/15/2022 10:54 AM     No results for input(s): TSH in the last 72 hours. No results for input(s): INR in the last 72 hours.     EK22: SR, rate: 63bpm, no delta wave, QTc 403ms. - Please see scan in Cardiology. 14 day Zio XT: 3/9/2022      EKG 12/17/21:      I have independently reviewed all of the ECGs and rhythm strips per above     Assessment/Plan: This is a 30 y.o. male with a history of obesity who presents with SVT    1. Supraventricular tachycardia  - Diagnosed 12/17/21.  - NCT with short RP.  - History of failed vagal maneuvers. - Terminated with IV Adenosine.  - Recurrent SVT on 5/15/22 which terminated with Vagal maneuver. - Vagal maneuver reviewed. -  We again discussed regarding rhythm control options including medication versus EP study +/- ablation, explaining the risks and benefits as outlined below. - I explained the pathophysiology of this condition, as well as the possibilities of AVNRT(60%),  AVRT(30%) or atrial tachycardia (10%) as the etiology of the patient's arrhythmias. The risks, benefits, and alternatives to EP study and catheter ablation were reviewed in detail today, including bleeding, blood clot, infection, vascular injury required surgical repair, a low but definite risk of AV block requiring permanent pacemaker implantation with AVNRT ablation, and heart attack, stroke, bleeding, and death associated with left atrial ablation. The patient verbalizes understanding and wishes to proceed with the procedure. 2. Obesity  Body mass index is 32.44 kg/m². Recommendations:  1. Will schedule EP study with RF ablation of SVT with Carto. 2. Hold Toprol XL 3 days before the procedure. 3. Follow up after the procedure. Encouraged the patient to call the office for any questions or concerns. I have spent a total of 40 minutes with the patient and the family reviewing the above stated recommendations.   And a total of >50% of that time involved face-to-face time providing counseling and or coordination of care with the other providers, preparation for the clinic visit, reviewing records/tests, counseling/education of the

## 2022-09-07 ENCOUNTER — OFFICE VISIT (OUTPATIENT)
Dept: NON INVASIVE DIAGNOSTICS | Age: 37
End: 2022-09-07
Payer: COMMERCIAL

## 2022-09-07 VITALS
DIASTOLIC BLOOD PRESSURE: 80 MMHG | BODY MASS INDEX: 32.56 KG/M2 | SYSTOLIC BLOOD PRESSURE: 122 MMHG | RESPIRATION RATE: 16 BRPM | HEART RATE: 48 BPM | HEIGHT: 71 IN | WEIGHT: 232.6 LBS

## 2022-09-07 DIAGNOSIS — I47.1 SVT (SUPRAVENTRICULAR TACHYCARDIA) (HCC): Primary | ICD-10-CM

## 2022-09-07 PROCEDURE — 1036F TOBACCO NON-USER: CPT | Performed by: INTERNAL MEDICINE

## 2022-09-07 PROCEDURE — G8427 DOCREV CUR MEDS BY ELIG CLIN: HCPCS | Performed by: INTERNAL MEDICINE

## 2022-09-07 PROCEDURE — 99215 OFFICE O/P EST HI 40 MIN: CPT | Performed by: INTERNAL MEDICINE

## 2022-09-07 PROCEDURE — 93000 ELECTROCARDIOGRAM COMPLETE: CPT | Performed by: INTERNAL MEDICINE

## 2022-09-07 PROCEDURE — G8417 CALC BMI ABV UP PARAM F/U: HCPCS | Performed by: INTERNAL MEDICINE

## 2022-09-07 NOTE — PATIENT INSTRUCTIONS
Vagal maneuvers that you can try to slow your fast heart rate include:  Holding your breath and bearing down (Valsalva maneuver). Immersing your face in ice-cold water (diving reflex). Coughing.

## 2022-09-28 ENCOUNTER — ANESTHESIA EVENT (OUTPATIENT)
Dept: CARDIAC CATH/INVASIVE PROCEDURES | Age: 37
End: 2022-09-28

## 2022-09-29 ENCOUNTER — HOSPITAL ENCOUNTER (OUTPATIENT)
Dept: CARDIAC CATH/INVASIVE PROCEDURES | Age: 37
Discharge: HOME | End: 2022-09-29
Attending: INTERNAL MEDICINE | Admitting: INTERNAL MEDICINE
Payer: COMMERCIAL

## 2022-09-29 ENCOUNTER — ANESTHESIA (OUTPATIENT)
Dept: CARDIAC CATH/INVASIVE PROCEDURES | Age: 37
End: 2022-09-29

## 2022-09-29 VITALS
HEIGHT: 70 IN | OXYGEN SATURATION: 98 % | BODY MASS INDEX: 30.78 KG/M2 | SYSTOLIC BLOOD PRESSURE: 118 MMHG | WEIGHT: 215 LBS | DIASTOLIC BLOOD PRESSURE: 76 MMHG | HEART RATE: 62 BPM | RESPIRATION RATE: 16 BRPM | TEMPERATURE: 97.7 F

## 2022-09-29 PROBLEM — I47.10 SVT (SUPRAVENTRICULAR TACHYCARDIA): Status: ACTIVE | Noted: 2022-09-29

## 2022-09-29 PROBLEM — I47.1 SVT (SUPRAVENTRICULAR TACHYCARDIA) (HCC): Status: ACTIVE | Noted: 2022-09-29

## 2022-09-29 LAB
ABO/RH: NORMAL
ANION GAP SERPL CALCULATED.3IONS-SCNC: 9 MMOL/L (ref 7–16)
ANTIBODY SCREEN: NORMAL
BASOPHILS ABSOLUTE: 0.04 E9/L (ref 0–0.2)
BASOPHILS RELATIVE PERCENT: 0.7 % (ref 0–2)
BUN BLDV-MCNC: 22 MG/DL (ref 6–20)
CALCIUM SERPL-MCNC: 9.2 MG/DL (ref 8.6–10.2)
CHLORIDE BLD-SCNC: 109 MMOL/L (ref 98–107)
CO2: 24 MMOL/L (ref 22–29)
CREAT SERPL-MCNC: 0.9 MG/DL (ref 0.7–1.2)
EKG ATRIAL RATE: 64 BPM
EKG P AXIS: 7 DEGREES
EKG P-R INTERVAL: 164 MS
EKG Q-T INTERVAL: 392 MS
EKG QRS DURATION: 78 MS
EKG QTC CALCULATION (BAZETT): 404 MS
EKG R AXIS: 27 DEGREES
EKG T AXIS: 15 DEGREES
EKG VENTRICULAR RATE: 64 BPM
EOSINOPHILS ABSOLUTE: 0.11 E9/L (ref 0.05–0.5)
EOSINOPHILS RELATIVE PERCENT: 2 % (ref 0–6)
GFR AFRICAN AMERICAN: >60
GFR NON-AFRICAN AMERICAN: >60 ML/MIN/1.73
GLUCOSE BLD-MCNC: 105 MG/DL (ref 74–99)
HCT VFR BLD CALC: 44.7 % (ref 37–54)
HEMOGLOBIN: 15.7 G/DL (ref 12.5–16.5)
IMMATURE GRANULOCYTES #: 0.02 E9/L
IMMATURE GRANULOCYTES %: 0.4 % (ref 0–5)
LYMPHOCYTES ABSOLUTE: 1.78 E9/L (ref 1.5–4)
LYMPHOCYTES RELATIVE PERCENT: 32.6 % (ref 20–42)
MAGNESIUM: 2.2 MG/DL (ref 1.6–2.6)
MCH RBC QN AUTO: 30.6 PG (ref 26–35)
MCHC RBC AUTO-ENTMCNC: 35.1 % (ref 32–34.5)
MCV RBC AUTO: 87.1 FL (ref 80–99.9)
MONOCYTES ABSOLUTE: 0.42 E9/L (ref 0.1–0.95)
MONOCYTES RELATIVE PERCENT: 7.7 % (ref 2–12)
NEUTROPHILS ABSOLUTE: 3.09 E9/L (ref 1.8–7.3)
NEUTROPHILS RELATIVE PERCENT: 56.6 % (ref 43–80)
PDW BLD-RTO: 11.7 FL (ref 11.5–15)
PLATELET # BLD: 328 E9/L (ref 130–450)
PMV BLD AUTO: 9.4 FL (ref 7–12)
POTASSIUM SERPL-SCNC: 4.6 MMOL/L (ref 3.5–5)
RBC # BLD: 5.13 E12/L (ref 3.8–5.8)
SODIUM BLD-SCNC: 142 MMOL/L (ref 132–146)
WBC # BLD: 5.5 E9/L (ref 4.5–11.5)

## 2022-09-29 PROCEDURE — 93653 COMPRE EP EVAL TX SVT: CPT | Performed by: INTERNAL MEDICINE

## 2022-09-29 PROCEDURE — 3700000000 HC ANESTHESIA ATTENDED CARE

## 2022-09-29 PROCEDURE — C1732 CATH, EP, DIAG/ABL, 3D/VECT: HCPCS

## 2022-09-29 PROCEDURE — 86900 BLOOD TYPING SEROLOGIC ABO: CPT

## 2022-09-29 PROCEDURE — 93653 COMPRE EP EVAL TX SVT: CPT

## 2022-09-29 PROCEDURE — 2580000003 HC RX 258: Performed by: NURSE ANESTHETIST, CERTIFIED REGISTERED

## 2022-09-29 PROCEDURE — 2500000003 HC RX 250 WO HCPCS

## 2022-09-29 PROCEDURE — 86850 RBC ANTIBODY SCREEN: CPT

## 2022-09-29 PROCEDURE — 36415 COLL VENOUS BLD VENIPUNCTURE: CPT

## 2022-09-29 PROCEDURE — 93005 ELECTROCARDIOGRAM TRACING: CPT | Performed by: INTERNAL MEDICINE

## 2022-09-29 PROCEDURE — 80048 BASIC METABOLIC PNL TOTAL CA: CPT

## 2022-09-29 PROCEDURE — 93623 PRGRMD STIMJ&PACG IV RX NFS: CPT | Performed by: INTERNAL MEDICINE

## 2022-09-29 PROCEDURE — 3700000001 HC ADD 15 MINUTES (ANESTHESIA)

## 2022-09-29 PROCEDURE — 83735 ASSAY OF MAGNESIUM: CPT

## 2022-09-29 PROCEDURE — 93623 PRGRMD STIMJ&PACG IV RX NFS: CPT

## 2022-09-29 PROCEDURE — C1894 INTRO/SHEATH, NON-LASER: HCPCS

## 2022-09-29 PROCEDURE — 2580000003 HC RX 258: Performed by: INTERNAL MEDICINE

## 2022-09-29 PROCEDURE — 6360000002 HC RX W HCPCS

## 2022-09-29 PROCEDURE — 85025 COMPLETE CBC W/AUTO DIFF WBC: CPT

## 2022-09-29 PROCEDURE — C1893 INTRO/SHEATH, FIXED,NON-PEEL: HCPCS

## 2022-09-29 PROCEDURE — C1730 CATH, EP, 19 OR FEW ELECT: HCPCS

## 2022-09-29 PROCEDURE — 6360000002 HC RX W HCPCS: Performed by: NURSE ANESTHETIST, CERTIFIED REGISTERED

## 2022-09-29 PROCEDURE — 86901 BLOOD TYPING SEROLOGIC RH(D): CPT

## 2022-09-29 PROCEDURE — 2500000003 HC RX 250 WO HCPCS: Performed by: NURSE ANESTHETIST, CERTIFIED REGISTERED

## 2022-09-29 PROCEDURE — 2709999900 HC NON-CHARGEABLE SUPPLY

## 2022-09-29 RX ORDER — SODIUM CHLORIDE 9 MG/ML
INJECTION, SOLUTION INTRAVENOUS ONCE
Status: COMPLETED | OUTPATIENT
Start: 2022-09-29 | End: 2022-09-29

## 2022-09-29 RX ORDER — SODIUM CHLORIDE 9 MG/ML
INJECTION, SOLUTION INTRAVENOUS CONTINUOUS PRN
Status: DISCONTINUED | OUTPATIENT
Start: 2022-09-29 | End: 2022-09-29 | Stop reason: SDUPTHER

## 2022-09-29 RX ORDER — MIDAZOLAM HYDROCHLORIDE 1 MG/ML
INJECTION INTRAMUSCULAR; INTRAVENOUS PRN
Status: DISCONTINUED | OUTPATIENT
Start: 2022-09-29 | End: 2022-09-29 | Stop reason: SDUPTHER

## 2022-09-29 RX ORDER — SODIUM CHLORIDE 0.9 % (FLUSH) 0.9 %
5-40 SYRINGE (ML) INJECTION EVERY 12 HOURS SCHEDULED
Status: DISCONTINUED | OUTPATIENT
Start: 2022-09-29 | End: 2022-09-29 | Stop reason: HOSPADM

## 2022-09-29 RX ORDER — FENTANYL CITRATE 50 UG/ML
INJECTION, SOLUTION INTRAMUSCULAR; INTRAVENOUS PRN
Status: DISCONTINUED | OUTPATIENT
Start: 2022-09-29 | End: 2022-09-29 | Stop reason: SDUPTHER

## 2022-09-29 RX ORDER — GLYCOPYRROLATE 0.2 MG/ML
INJECTION INTRAMUSCULAR; INTRAVENOUS PRN
Status: DISCONTINUED | OUTPATIENT
Start: 2022-09-29 | End: 2022-09-29 | Stop reason: SDUPTHER

## 2022-09-29 RX ORDER — ACETAMINOPHEN 325 MG/1
650 TABLET ORAL EVERY 4 HOURS PRN
Status: DISCONTINUED | OUTPATIENT
Start: 2022-09-29 | End: 2022-09-29 | Stop reason: HOSPADM

## 2022-09-29 RX ORDER — SODIUM CHLORIDE 0.9 % (FLUSH) 0.9 %
5-40 SYRINGE (ML) INJECTION PRN
Status: DISCONTINUED | OUTPATIENT
Start: 2022-09-29 | End: 2022-09-29 | Stop reason: HOSPADM

## 2022-09-29 RX ORDER — PROPOFOL 10 MG/ML
INJECTION, EMULSION INTRAVENOUS PRN
Status: DISCONTINUED | OUTPATIENT
Start: 2022-09-29 | End: 2022-09-29 | Stop reason: SDUPTHER

## 2022-09-29 RX ORDER — SODIUM CHLORIDE 9 MG/ML
INJECTION, SOLUTION INTRAVENOUS PRN
Status: DISCONTINUED | OUTPATIENT
Start: 2022-09-29 | End: 2022-09-29 | Stop reason: HOSPADM

## 2022-09-29 RX ADMIN — SODIUM CHLORIDE: 9 INJECTION, SOLUTION INTRAVENOUS at 06:55

## 2022-09-29 RX ADMIN — MIDAZOLAM 1 MG: 1 INJECTION INTRAMUSCULAR; INTRAVENOUS at 09:58

## 2022-09-29 RX ADMIN — MIDAZOLAM 1 MG: 1 INJECTION INTRAMUSCULAR; INTRAVENOUS at 09:22

## 2022-09-29 RX ADMIN — GLYCOPYRROLATE 0.2 MG: 0.2 INJECTION, SOLUTION INTRAMUSCULAR; INTRAVENOUS at 09:30

## 2022-09-29 RX ADMIN — PROPOFOL 50 MG: 10 INJECTION, EMULSION INTRAVENOUS at 08:09

## 2022-09-29 RX ADMIN — FENTANYL CITRATE 50 MCG: 50 INJECTION, SOLUTION INTRAMUSCULAR; INTRAVENOUS at 09:14

## 2022-09-29 RX ADMIN — FENTANYL CITRATE 50 MCG: 50 INJECTION, SOLUTION INTRAMUSCULAR; INTRAVENOUS at 09:58

## 2022-09-29 RX ADMIN — SODIUM CHLORIDE: 9 INJECTION, SOLUTION INTRAVENOUS at 07:35

## 2022-09-29 RX ADMIN — PROPOFOL 50 MCG/KG/MIN: 10 INJECTION, EMULSION INTRAVENOUS at 08:15

## 2022-09-29 RX ADMIN — PROPOFOL 30 MG: 10 INJECTION, EMULSION INTRAVENOUS at 09:13

## 2022-09-29 RX ADMIN — PROPOFOL 50 MG: 10 INJECTION, EMULSION INTRAVENOUS at 08:12

## 2022-09-29 RX ADMIN — MIDAZOLAM 1 MG: 1 INJECTION INTRAMUSCULAR; INTRAVENOUS at 09:40

## 2022-09-29 RX ADMIN — FENTANYL CITRATE 50 MCG: 50 INJECTION, SOLUTION INTRAMUSCULAR; INTRAVENOUS at 09:22

## 2022-09-29 RX ADMIN — MIDAZOLAM 1 MG: 1 INJECTION INTRAMUSCULAR; INTRAVENOUS at 09:33

## 2022-09-29 RX ADMIN — MIDAZOLAM 1 MG: 1 INJECTION INTRAMUSCULAR; INTRAVENOUS at 09:14

## 2022-09-29 ASSESSMENT — LIFESTYLE VARIABLES: SMOKING_STATUS: 0

## 2022-09-29 NOTE — H&P
[address] [facility],[address] and [address] [facility] Electrophysiology  History and Physical Examination  PATIENT: Chandu Davila RECORD NUMBER: [id number]  DATE OF SERVICE:  9/29/2022  ATTENDING ELECTROPHYSIOLOGIST: Tyree Goyal MD  REFERRING PHYSICIAN: George Dela Cruz MD  CHIEF COMPLAINT: SVTs    HPI: This is a 30 y.o. male with a history of obesity who presents to cardiac electrophysiology clinic for management of SVTs. Since his last office visit, Mr. Verner Ink underwent echocardiogram on 6/8/22 which showed normal LV function. He reports feeling overall well and denies any recurrent SVT episode. He presents today in SR. Again, we discussed regarding rhythm control options including continue medical therapy versus EP study +/- ablation, explaining the risks and benefits as outlined below. The patient denies any chest pain, dyspnea, palpitations, dizziness, syncope, orthopnea or paroxysmal nocturnal dyspnea. 9/29/22: The patient is seen in the hospital for elective EP study and RF ablation. I explained the pathophysiology of this condition, as well as the possibilities of AVNRT(60%),  AVRT(30%) or atrial tachycardia (10%) as the etiology of the patient's arrhythmias. I also gave the patient the options of continued medical therapy versus EP study and catheter ablation, with the latter being the more likely approach to achieve a long-term cure without the need for medications. The risks, benefits, and alternatives to EP study and catheter ablation were reviewed in detail today, including bleeding, blood clot, infection, vascular injury required surgical repair, a low but definite risk of AV block requiring permanent pacemaker implantation with AVNRT ablation, and heart attack, stroke, bleeding, and death associated with left atrial ablation. The patient understands these risks and agrees to proceed with EP study and ablation at present.      Patient Active Problem List    Diagnosis Date Noted    Disorder of male genital organs 2022     Priority: Medium    Gastroenteritis 2022     Priority: Medium    Insomnia 2022     Priority: Medium    Low back pain 2022     Priority: Medium    Nicotine dependence 2022     Priority: Medium    Pain in joint, lower leg 2022     Priority: Medium    Patellofemoral dysfunction 2022     Priority: Medium    Sprain, metatarsophalangeal joint 2022     Priority: Medium    Tinea cruris 2022     Priority: Medium    Upper respiratory infection 2022     Priority: Medium       Past Medical History:   Diagnosis Date    SVT (supraventricular tachycardia) ([facility] [address]        Family History   Problem Relation Age of Onset    Diabetes Father        Social History     Tobacco Use    Smoking status: Former     Packs/day: 0.25     Years: 14.00     Pack years: 3.50     Types: Cigarettes     Quit date: 3/9/2020     Years since quittin.5    Smokeless tobacco: Never   Substance Use Topics    Alcohol use: Yes     Comment: occasional       Current Outpatient Medications   Medication Sig Dispense Refill    metoprolol succinate (TOPROL XL) 25 MG extended release tablet Take 1 tablet by mouth daily 90 tablet 1     No current facility-administered medications for this encounter. No Known Allergies    ROS:   Constitutional: Negative for fever, activity change and appetite change. HENT: Negative for epistaxis. Eyes: Negative for diploplia, blurred vision. Respiratory: Negative for cough, chest tightness, shortness of breath and wheezing. Cardiovascular: pertinent positives in HPI  Gastrointestinal: Negative for abdominal pain and blood in stool.    All other review of systems are negative     PHYSICAL EXAM:   Vitals:    22 0644   BP: 123/70   Pulse: 69   Resp: 20   Temp: 97.6 °F (36.4 °C)   Weight: 215 lb (97.5 kg)   Height: 5' 10\" (1.778 m)        Constitutional: Well-developed, no acute distress  Eyes: conjunctivae normal, no xanthelasma   Ears, Nose, Throat: oral mucosa moist, no cyanosis   CV: no JVD. Regular rate and rhythm. Normal S1S2 and no S3. No murmurs, rubs, or gallops. PMI is nondisplaced  Lungs: clear to auscultation bilaterally, normal respiratory effort without used of accessory muscles  Abdomen: soft, non-tender, bowel sounds present, no masses or hepatomegaly   Musculoskeletal: no digital clubbing, no edema   Skin: warm, no rashes     I have personally reviewed the laboratory, cardiac diagnostic and radiographic testing as outlined below:    Data:    Recent Labs     22  0635   WBC 5.5   HGB 15.7   HCT 44.7        No results for input(s): NA, K, CL, CO2, BUN, CREATININE, GLU, CALCIUM in the last 72 hours. Invalid input(s): MAGNESIUM   Lab Results   Component Value Date/Time    MG 1.8 05/15/2022 10:54 AM     No results for input(s): TSH in the last 72 hours. No results for input(s): INR in the last 72 hours. EK22: SR, rate: 63bpm, no delta wave, QTc 403ms. - Please see scan in Cardiology. 14 day Zio XT: 3/9/2022      EKG 21:      I have independently reviewed all of the ECGs and rhythm strips per above     Assessment/Plan: This is a 30 y.o. male with a history of obesity who presents with SVT    1. Supraventricular tachycardia  - Diagnosed 21.  - NCT with short RP.  - History of failed vagal maneuvers. - Terminated with IV Adenosine.  - Recurrent SVT on 5/15/22 which terminated with Vagal maneuver. - Vagal maneuver reviewed. -  We again discussed regarding rhythm control options including medication versus EP study +/- ablation, explaining the risks and benefits as outlined below. - I explained the pathophysiology of this condition, as well as the possibilities of AVNRT(60%),  AVRT(30%) or atrial tachycardia (10%) as the etiology of the patient's arrhythmias.  The risks, benefits, and alternatives to EP study and catheter ablation were reviewed in detail today, including bleeding, blood clot, infection, vascular injury required surgical repair, a low but definite risk of AV block requiring permanent pacemaker implantation with AVNRT ablation, and heart attack, stroke, bleeding, and death associated with left atrial ablation. The patient verbalizes understanding and wishes to proceed with the procedure. 2. Obesity  Body mass index is 30.85 kg/m². Recommendations:  1. Will proceed with EP study with RF ablation of SVT with Carto. 2. Follow up after the procedure. Encouraged the patient to call the office for any questions or concerns. Thank you for allowing me to participate in your patient's care. Please call me if there are any questions or concerns.       Marybeth Guillory MD  Cardiac Electrophysiology  [address]  [facility] [place]: [facility] Electrophysiology  9/7/22   7:07 AM

## 2022-09-29 NOTE — ANESTHESIA PRE PROCEDURE
Department of Anesthesiology  Preprocedure Note       Name:  Nehemiah Mcdonough   Age:  30 y.o.  :  1985                                          MRN:  [id number]         Date:  2022      Surgeon: Maryanne Mcneill    Procedure: EPS, SVT ablation    Medications prior to admission:   Prior to Admission medications    Medication Sig Start Date End Date Taking? Authorizing Provider   metoprolol succinate (TOPROL XL) 25 MG extended release tablet Take 1 tablet by mouth daily 22   Yonas Roche MD       Current medications:    Current Outpatient Medications   Medication Sig Dispense Refill    metoprolol succinate (TOPROL XL) 25 MG extended release tablet Take 1 tablet by mouth daily 90 tablet 1     No current facility-administered medications for this encounter. Allergies:  No Known Allergies    Problem List:    Patient Active Problem List   Diagnosis Code    Disorder of male genital organs N50.9    Gastroenteritis K52.9    Insomnia G47.00    Low back pain M54.50    Nicotine dependence F17.200    Pain in joint, lower leg M25.569    Patellofemoral dysfunction M25.869    Sprain, metatarsophalangeal joint S93.529A    Tinea cruris B35.6    Upper respiratory infection J06.9       Past Medical History:        Diagnosis Date    SVT (supraventricular tachycardia) (HCC)        Past Surgical History:        Procedure Laterality Date    KNEE SURGERY Left     ACL, miniscus repair    VASECTOMY  2022       Social History:    Social History     Tobacco Use    Smoking status: Former     Packs/day: 0.25     Years: 14.00     Pack years: 3.50     Types: Cigarettes     Quit date: 3/9/2020     Years since quittin.5    Smokeless tobacco: Never   Substance Use Topics    Alcohol use: Yes     Comment: occasional                                Counseling given: Not Answered      Vital Signs (Current): There were no vitals filed for this visit.                                            BP Readings from Last 3 Encounters:   09/07/22 122/80   05/31/22 118/80   05/15/22 113/70       NPO Status:  Last solid and liquid consumption 9/28/22 at 2200. BMI:   Wt Readings from Last 3 Encounters:   09/07/22 232 lb 9.6 oz (105.5 kg)   05/31/22 226 lb 12.8 oz (102.9 kg)   03/09/22 231 lb (104.8 kg)     There is no height or weight on file to calculate BMI.    CBC:   Lab Results   Component Value Date/Time    WBC 6.6 05/15/2022 10:54 AM    RBC 5.10 05/15/2022 10:54 AM    HGB 15.5 05/15/2022 10:54 AM    HCT 44.6 05/15/2022 10:54 AM    MCV 87.5 05/15/2022 10:54 AM    RDW 11.8 05/15/2022 10:54 AM     05/15/2022 10:54 AM       CMP:   Lab Results   Component Value Date/Time     05/15/2022 10:54 AM    K 3.8 05/15/2022 10:54 AM    K 4.6 12/17/2021 11:49 AM     05/15/2022 10:54 AM    CO2 22 05/15/2022 10:54 AM    BUN 21 05/15/2022 10:54 AM    CREATININE 0.9 05/15/2022 10:54 AM    GFRAA >60 05/15/2022 10:54 AM    LABGLOM >60 05/15/2022 10:54 AM    GLUCOSE 102 05/15/2022 10:54 AM    CALCIUM 8.8 05/15/2022 10:54 AM       POC Tests: No results for input(s): POCGLU, POCNA, POCK, POCCL, POCBUN, POCHEMO, POCHCT in the last 72 hours. Coags: No results found for: PROTIME, INR, APTT    HCG (If Applicable): No results found for: PREGTESTUR, PREGSERUM, HCG, HCGQUANT     ABGs: No results found for: PHART, PO2ART, GXS6PDA, TQH3TDT, BEART, C2DORJBZ     Type & Screen (If Applicable):  No results found for: LABABO, LABRH    Drug/Infectious Status (If Applicable):  No results found for: HIV, HEPCAB    COVID-19 Screening (If Applicable): No results found for: COVID19        ECHO: 6/8/2022   Findings      Left Ventricle   Left ventricular size is grossly normal.   Ejection fraction is visually estimated at 60%. No regional wall motion abnormalities seen. Normal left ventricular diastolic filling pattern for age.       Right Ventricle   Normal right ventricular size and function. Left Atrium   Left atrium is of normal size. Right Atrium   Normal right atrium size. Mitral Valve   Mild mitral regurgitation is present. Tricuspid Valve   Mild tricuspid regurgitation. RVSP is 24 mmHg. Aortic Valve   Aortic valve opens well. Pulmonic Valve   Mild pulmonic regurgitation present. Pericardial Effusion   No evidence of pericardial effusion. Aorta   Aortic root dimension within normal limits. Miscellaneous   Normal Inferior Vena Cava diameter and respiratory variation. Conclusions      Summary   Left ventricular size is grossly normal.   Ejection fraction is visually estimated at 60%. No regional wall motion abnormalities seen. Normal left ventricular diastolic filling pattern for age. Signature      ----------------------------------------------------------------   Electronically signed by Xavi Calvo MD(Interpreting physician)   on 06/08/2022 06:58 PM    Anesthesia Evaluation  Patient summary reviewed and Nursing notes reviewed no history of anesthetic complications:   Airway: Mallampati: II  TM distance: >3 FB   Neck ROM: full  Mouth opening: > = 3 FB   Dental: normal exam     Comment: Patient denies any chipped, loose, or missing teeth    Pulmonary:normal exam  breath sounds clear to auscultation      (-) not a current smoker          Patient did not smoke on day of surgery. Cardiovascular:  Exercise tolerance: good (>4 METS),   (+) dysrhythmias: SVT,       ECG reviewed  Rhythm: regular  Rate: abnormal  Echocardiogram reviewed    Cleared by cardiology     : Toprol XL 25 last taken 9/25/2022. Neuro/Psych:   Negative Neuro/Psych ROS              GI/Hepatic/Renal:   (+) GERD: well controlled,           Endo/Other: Negative Endo/Other ROS             Pt had no PAT visit       Abdominal:             Vascular: negative vascular ROS.          Other Findings:           Anesthesia Plan      MAC

## 2022-09-29 NOTE — OP NOTE
[address] [facility] Electrophysiology  Procedure Report  PATIENT: Brianna Sharp  MEDICAL RECORD NUMBER: [id number]  DATE OF PROCEDURE:  9/29/2022  ATTENDING ELECTROPHYSIOLOGIST: Monroe Vega MD  REFERRING PHYSICIAN: Dr. Mabel Etienne  1985  Date of Service: 9/29/2022    CC: This is a 30 y.o. male with a history of symptomatic supraventricular tachycardia. PROCEDURE PERFORMED:  1. Electrophysiology study with induction + atrioventricular node reentry tachycardia (SVT) ablation (06448)  2. Left atrial recording and pacing via the coronary sinus catheter which sits posterior and inferior to the left BNSKOU(13644-28)  3. 3-dimensional electranatomic cardiac mapping (24857)  4. Isoproterenol drug infusion for initiation of arrhythmia (32778-45-04)    INDICATION FOR PROCEDURE:  1. Symptomatic supraventricular tachycardia. PROCEDURE PERFORMED By: Monroe Vega MD    MEDICATIONS:  1. Isoproterenol 35.2 mcg IV. ESTIMATED BLOOD LOSS: Less than 10 mL. COMPLICATIONS:  None immediately apparent    FLUOROSCOPY TIME: 17.4 minutes. DESCRIPTION OF PROCEDURE: The risks, benefits, and alternatives to the procedure were discussed with the patient, and informed consent was obtained. The patient was brought to the electrophysiology lab and was prepped and draped in the usual fashion. The right and left groins were anesthetized using 2% lidocaine, and the right and left femoral veins were accessed using an 18-gauge Cook needle. Using the modified Seldinger technique, one 8-Nicaraguan, one 7-Nicaraguan and two 6-Nicaraguan hemostatic sheaths were introduced over a J-tip guidewire. Two 6-Nicaraguan Don quadripolar catheters were introduced under fluoroscopic guidance to the high right atrium and right ventricular base. A 6-Nicaraguan CRD-2 quadripolar catheter was placed in the His bundle region.  A Bard dynamic deflectable decapolar catheter was introduced to the coronary sinus (which sits posterior and inferior to the left-atrium). Diagnostic EP study was then performed, followed by radiofrequency catheter ablation of the AV node slow pathway. BASELINE ELECTROPHYSIOLOGIC DATA:   Baseline intervals were as follows:   Sinus cycle length 1060 ms  AH interval 79 ms   HV interval 53 ms   VA Wenckebach cycle length was 470 ms  Retrograde conduction was slightly eccentric, earliest was CS 5-6 and CS 7-8 but appeared to be decremental  The VERP was 230 ms at 600 ms  The AV Wenckebach cycle length was 380 ms  Ventricular preexcitation was absent during pacing from both the high right atrium and coronary sinus (left atrial pacing and recording). The ERP of the AV praful fast pathway was 340 ms at 500 ms  The ERP of the AV praful slow pathway was 280 ms at 500 ms    TACHYCARDIA CHARACTERISTICS:   A narrow complex tachycardia at a cycle length of 340-400 msec was induced with rapid atrial pacing. It was terminated with burst ventricular pacing. With delivery of ventricular burst pacing, the tachycardia was entrained (continuously reset), and a V-A-H-V response was present. His-refractory PVCs were delivered, and atrial preexcitation was not noted. Additional tachycardia characteristics included septal VA time was 35 ms and PPI-TCL was 200 ms. 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 RADIOFREQUENCY ABLATION DATA:  A 4 mm Biosense bidirectional (D, F curve) ablation catheter was advanced to the region of right atrium and triangle of Stark with the help of 8.5-Togolese long SRO sheath. Using the [facility] 3-D electroanatomic mapping system we created a 3D- EAM of the RA/coronary sinus with particular focus on the region of the [address].   Utilizing electrogram analysis looking for a favorable V:A ratio as well as the electro-anatomic map we started in the most inferior portion of the traditional \"slow pathway\" region of the triangle to begin ablation. Short RF ablation for 15s was given in this region to look for areas of slow junctional beats. If this was found, ablation continued up to a maximum of 60s. If slow junctional beats were NOT identified then we would move slightly more posterior and superior to do repeat mapping. We identified an area of excellent slow junctional beats as identified on our EAM as a red dot. A total of 8 RF applications were delivered throughout the study. Settings were a temperature of 55 degrees, power output of 20-50 paez, and duration of 15-60s. The patient was observed for 30 minutes post ablation and no residual SVT could be induced. FINAL ELECTROPHYSIOLOGIC DATA:   Sinus cycle length 750 ms  AH interval 90 ms  HV interval 46 ms  AV Wenckebach cycle length 350 ms  ERP of the AV node fast pathway 270 ms at 600 ms, 280 ms at 500 ms and 280 ms at 400 ms  Possible single praful echo beats were noted only  VA Wenckebach cycle length 320 ms  VERP was 210 at 600 ms    Isuprel was initiated and titrated to effect at a maximum of 2 mcg/min. Repeat EP test was performed which showed  Sinus cycle length 500 ms  AV Wenckebach cycle length 240 ms  ERP of the AV node fast pathway <200 ms at 400 ms  No praful echo beat was noted. No induction of SVT could be performed in the baseline, isuprel, or withdrawal states    Catheters were removed under flourosopic guidance and sheaths were removed and manual pressure was held for hemostasis. The patient was hemodynamically stable and under the care of the CRNA and will be brought back to the recovery area. SUMMARY:   1. Atrioventricular node reentry tachycardia. 2. Successful RF ablation of atrioventricular node reentry tachycardia (slow pathway modification). 3. Normal His-Purkinje system function    RECOMMENDATIONS:   1. Admit to telemetry for observation.   2. Bedrest for 4 hours and then will be discharged home if clinically is stable. 3. Discontinue Toprol XL. 4. Follow-up in 1 week for EKG and in 1 month with provider.     Kialey Graham MD  Cardiac Electrophysiology  [address]  [facility]  7:18 AM  9/29/2022

## 2022-09-29 NOTE — DISCHARGE INSTRUCTIONS
Ablation Patient Discharge Instructions      Medications: Follow-Up: You will be seen in 1 week for an EKG and in 4 weeks for a follow-up evaluation. Please call the [facility] office if you need to re-schedule this appointment; [phone]. It is not uncommon for patients to experience brief episodes of palpitations, but please call the [facility] office if you are having frequent symptoms. Incision Care: Check bilateral groins daily. No soaking in a bathtub, hot tub or pool for one week. You may shower. If you find any redness, swelling, drainage, warmth, or have a fever greater than 100 degrees, notify the office immediately at [phone]. Activity: No lifting greater than 10 lbs for 5 days, and no strenuous exercise for 2 weeks. You may drive if you feel up to it. DO NOT drive until you have stopped taking prescription pain medication. [facility]:     [facility] [address], [address] [place] [address]) Electrophysiology   [phone]

## 2022-09-29 NOTE — PROGRESS NOTES
Extended Stay Recovery Discharge Documentation    Final procedure site check completed, stable for discharge- Yes  Ambulated without issue post recovery completion, gait steady. Peripheral IV sites removed (see IV Flowsheet) and site asymptomatic- Yes  Patient dressed self without assistance. Discharge instructions reviewed with Patient and verbalized understanding.    Patient discharged Home with spouse at 4 PM  Monitor cleaned and placed back in nurses' station- Yes

## 2022-09-29 NOTE — ANESTHESIA POSTPROCEDURE EVALUATION
Department of Anesthesiology  Postprocedure Note    Patient: Ronda Sher  MRN: [id number]  YOB: 1985  Date of evaluation: 9/29/2022      Procedure Summary     Date: 09/29/22 [place] [facility]    Anesthesia Start: 5199 Anesthesia Stop: 3009    Procedure: ABLATION WITH ANESTHESIA Diagnosis:       Supraventricular tachycardia      SVT (supraventricular tachycardia) ([facility] [address]    Scheduled Providers: ЮЛИЯ Kenyon - CRNA; Nj Agarwal MD Responsible Provider: Nj Agarwal MD    Anesthesia Type: MAC ASA Status: 2          Anesthesia Type: No value filed.     Lana Phase I:      Lana Phase II:        Anesthesia Post Evaluation    Patient location during evaluation: PACU  Patient participation: complete - patient participated  Level of consciousness: awake  Pain score: 0  Airway patency: patent  Nausea & Vomiting: no nausea  Complications: no  Cardiovascular status: hemodynamically stable  Respiratory status: acceptable  Hydration status: stable  Multimodal analgesia pain management approach

## 2022-10-06 ENCOUNTER — NURSE ONLY (OUTPATIENT)
Dept: NON INVASIVE DIAGNOSTICS | Age: 37
End: 2022-10-06
Payer: COMMERCIAL

## 2022-10-06 ENCOUNTER — HOSPITAL ENCOUNTER (EMERGENCY)
Age: 37
Discharge: HOME | End: 2022-10-06
Payer: COMMERCIAL

## 2022-10-06 VITALS
HEART RATE: 89 BPM | WEIGHT: 218 LBS | HEIGHT: 70 IN | RESPIRATION RATE: 20 BRPM | TEMPERATURE: 98.4 F | SYSTOLIC BLOOD PRESSURE: 142 MMHG | DIASTOLIC BLOOD PRESSURE: 79 MMHG | BODY MASS INDEX: 31.21 KG/M2 | OXYGEN SATURATION: 96 %

## 2022-10-06 DIAGNOSIS — I47.1 SVT (SUPRAVENTRICULAR TACHYCARDIA) (HCC): Primary | ICD-10-CM

## 2022-10-06 DIAGNOSIS — H65.02 NON-RECURRENT ACUTE SEROUS OTITIS MEDIA OF LEFT EAR: Primary | ICD-10-CM

## 2022-10-06 PROCEDURE — 93000 ELECTROCARDIOGRAM COMPLETE: CPT | Performed by: INTERNAL MEDICINE

## 2022-10-06 PROCEDURE — 99283 EMERGENCY DEPT VISIT LOW MDM: CPT

## 2022-10-06 RX ORDER — CEPHALEXIN 500 MG/1
500 CAPSULE ORAL 4 TIMES DAILY
Qty: 28 CAPSULE | Refills: 0 | Status: SHIPPED | OUTPATIENT
Start: 2022-10-06 | End: 2022-10-13

## 2022-10-06 RX ORDER — LORATADINE 10 MG/1
10 TABLET ORAL DAILY
Qty: 30 TABLET | Refills: 0 | Status: SHIPPED | OUTPATIENT
Start: 2022-10-06

## 2022-10-06 RX ORDER — GUAIFENESIN 600 MG/1
600 TABLET, EXTENDED RELEASE ORAL 2 TIMES DAILY
Qty: 30 TABLET | Refills: 0 | Status: SHIPPED | OUTPATIENT
Start: 2022-10-06 | End: 2022-10-21

## 2022-10-06 NOTE — Clinical Note
Sergio Cruz was seen and treated in our emergency department on 10/6/2022. He may return to work on 10/08/2022. If you have any questions or concerns, please don't hesitate to call.       James Dahl, ЮЛИЯ - CNP

## 2022-10-06 NOTE — PROGRESS NOTES
Patient was in today for EKG per Dr. Monroe Vega, 1 week s/p ablation. Patient has no complaints.     Electronically signed by Sukhi Taveras MA on 10/6/2022 at 2:30 PM

## 2022-10-06 NOTE — ED PROVIDER NOTES
[facility]  Department of Emergency Medicine   ED  Encounter Note  Admit Date/RoomTime: 10/6/2022 11:08 AM  ED Room: KRISTAL/KRISTAL    NAME: Yvan Muñoz  : 1985  MRN: [id number]     Chief Complaint:  Other (Left outer ear itching and red for 1 day)    History of Present Illness        Yvan Muñoz is a 30 y.o. old male who presenting to the emergency department with complaint of sudden onset left ear pain. Symptoms began 2 days ago and have been stable since that time. Patient denies facial pain, fever, headache, myalgias, nasal congestion, nonproductive cough, or productive cough. His symptoms are relieved by nothing. He has recent history of nothing pertinent as it pertains to today's visit. ROS   Pertinent positives and negatives are stated within HPI, all other systems reviewed and are negative. Past Medical History:  has a past medical history of SVT (supraventricular tachycardia) ([address] Surgical History:  has a past surgical history that includes knee surgery (Left, 2017); Vasectomy (2022); and ablation of dysrhythmic focus (2022). Social History:  reports that he quit smoking about 2 years ago. His smoking use included cigarettes. He has a 3.50 pack-year smoking history. He has never used smokeless tobacco. He reports current alcohol use. He reports that he does not use drugs. Family History: family history includes Diabetes in his father. Allergies: Patient has no known allergies. Physical Exam   Oxygen Saturation Interpretation: Normal.        ED Triage Vitals   BP Temp Temp Source Heart Rate Resp SpO2 Height Weight   10/06/22 1106 10/06/22 1106 10/06/22 1106 10/06/22 1106 10/06/22 1106 10/06/22 1106 10/06/22 1115 10/06/22 1115   (!) 142/79 98.4 °F (36.9 °C) Oral 89 20 96 % 5' 10\" (1.778 m) 218 lb (98.9 kg)         Constitutional:  Alert, development consistent with age.   Ears:  External Ears: Bilateral normal. TM's & External Canals:  normal right TM and external ear canal, abnormal TM left ear - dull, air-fluid level demonstrated, serous middle ear fluid. Nose:   There is clear rhinorrhea present  Throat:  Pharynx without injection, exudate, or tonsillar hypertrophy. Airway patient. Neck:  Normal ROM. Supple. Respiratory:  Clear to auscultation and breath sounds equal.    CV: Regular rate and rhythm, normal heart sounds, without pathological murmurs, ectopy, gallops, or rubs. Skin:  No rashes, erythema present, unless noted elsewhere. Lymphatic: No lymphangitis or adenopathy noted. Neurological:  Oriented. Motor functions intact. Lab / Imaging Results   (All laboratory and radiology results have been personally reviewed by myself)  Labs:  No results found for this visit on 10/06/22. Imaging: All Radiology results interpreted by Radiologist unless otherwise noted. No orders to display     ED Course / Medical Decision Making   Medications - No data to display         Consult(s):   None    Procedure(s):   None    MDM:   At this time the patient is without objective evidence of an acute process requiring hospitalization or inpatient management. They have remained hemodynamically stable throughout their entire ED visit and are stable for discharge with outpatient follow-up. The plan has been discussed in detail and they are aware of the specific conditions for emergent return, as well as the importance of follow-up. Plan of Care/Counseling:  ЮЛИЯ Rios CNP reviewed today's visit with the patient in addition to providing specific details for the plan of care and counseling regarding the diagnosis and prognosis. Questions are answered at this time and are agreeable with the plan. Assessment      1. Non-recurrent acute serous otitis media of left ear      Plan   Discharged home.   Patient condition is good    New Medications     Discharge Medication List as of 10/6/2022 11:52 AM START taking these medications    Details   loratadine (CLARITIN) 10 MG tablet Take 1 tablet by mouth daily, Disp-30 tablet, R-0Normal      guaiFENesin (MUCINEX) 600 MG extended release tablet Take 1 tablet by mouth 2 times daily for 15 days, Disp-30 tablet, R-0Normal           Electronically signed by ЮЛИЯ Lynch CNP   DD: 10/6/22  **This report was transcribed using voice recognition software. Every effort was made to ensure accuracy; however, inadvertent computerized transcription errors may be present.   END OF ED PROVIDER NOTE       ЮЛИЯ Villalobos CNP  10/06/22 [id number]

## 2022-10-07 ENCOUNTER — TELEPHONE (OUTPATIENT)
Dept: CARDIAC CATH/INVASIVE PROCEDURES | Age: 37
End: 2022-10-07

## 2022-10-07 NOTE — TELEPHONE ENCOUNTER
I attempted to reach Mr. Omkar Sanabria there was no answer and no VM. Pt was seen in the office yesterday for EKG and had not complaints.

## 2022-10-13 ENCOUNTER — TELEPHONE (OUTPATIENT)
Dept: ORTHOPEDIC SURGERY | Age: 37
End: 2022-10-13

## 2022-10-13 NOTE — TELEPHONE ENCOUNTER
Left voicemail for patient that Dr. Gio Francis would like to see him in the office in 7-10days. Instructed patient to bring a copy of his XR on a disc for dr. Gio Francis to review. Office phone number provided to call back and schedule.

## 2022-11-15 NOTE — PROGRESS NOTES
[facility]  Outpatient Progress note. PATIENT: Chandu Davila RECORD NUMBER: [id number]  DATE OF SERVICE:  11/16/2022  ATTENDING ELECTROPHYSIOLOGIST: Clari Malone MD  REFERRING PHYSICIAN: Kitty Day MD  CHIEF COMPLAINT: SVTs    HPI: This is a 30 y.o. male with a history of obesity who presents to cardiac electrophysiology clinic for management of SVTs. Mr. Bertin Gibbs underwent echocardiogram on 6/8/22 which showed normal LV function. He underwent SVT ablation on 9/29/2022 who that showed AV praful reentry tachycardia and had successful RF ablation of the slow pathway. Today he presents in sinus without complaints of recurrent palpitations. Noted intermittent awareness of blood pressure, typically associated with standing, not associated with tachycardia. His groin sites are soft and compressible.       Patient Active Problem List    Diagnosis Date Noted    SVT (supraventricular tachycardia) ([facility] 09/29/2022     Priority: Medium    Disorder of male genital organs 05/31/2022     Priority: Medium    Gastroenteritis 05/31/2022     Priority: Medium    Insomnia 05/31/2022     Priority: Medium    Low back pain 05/31/2022     Priority: Medium    Nicotine dependence 05/31/2022     Priority: Medium    Pain in joint, lower leg 05/31/2022     Priority: Medium    Patellofemoral dysfunction 05/31/2022     Priority: Medium    Sprain, metatarsophalangeal joint 05/31/2022     Priority: Medium    Tinea cruris 05/31/2022     Priority: Medium    Upper respiratory infection 05/31/2022     Priority: Medium       Past Medical History:   Diagnosis Date    SVT (supraventricular tachycardia) ([facility] 09/29/2022    RFA of SVT Dr Nikko Olivarez       Family History   Problem Relation Age of Onset    Diabetes Father        Social History     Tobacco Use    Smoking status: Former     Packs/day: 0.25     Years: 14.00     Pack years: 3.50     Types: Cigarettes Quit date: 3/9/2020     Years since quittin.6    Smokeless tobacco: Never   Substance Use Topics    Alcohol use: Yes     Comment: occasional       Current Outpatient Medications   Medication Sig Dispense Refill    pantoprazole (PROTONIX) 40 MG tablet Take 40 mg by mouth daily      traZODone (DESYREL) 50 MG tablet Take 50 mg by mouth nightly as needed      loratadine (CLARITIN) 10 MG tablet Take 1 tablet by mouth daily (Patient not taking: Reported on 2022) 30 tablet 0     No current facility-administered medications for this visit. No Known Allergies    ROS:   Constitutional: Negative for fever, activity change and appetite change. HENT: Negative for epistaxis. Eyes: Negative for diploplia, blurred vision. Respiratory: Negative for cough, chest tightness, shortness of breath and wheezing. Cardiovascular: pertinent positives in HPI  Gastrointestinal: Negative for abdominal pain and blood in stool. All other review of systems are negative     PHYSICAL EXAM:   Vitals:    22 0757   BP: 134/88   Site: Left Upper Arm   Position: Sitting   Cuff Size: Medium Adult   Pulse: 73   Resp: 16   Weight: 238 lb 3.2 oz (108 kg)   Height: 5' 10\" (1.778 m)          Constitutional: Well-developed, no acute distress  Eyes: conjunctivae normal, no xanthelasma   Ears, Nose, Throat: oral mucosa moist, no cyanosis   CV: no JVD. Regular rate and rhythm. Normal S1S2 and no S3. No murmurs, rubs, or gallops. PMI is nondisplaced  Lungs: clear to auscultation bilaterally, normal respiratory effort without used of accessory muscles  Abdomen: soft, non-tender, bowel sounds present, no masses or hepatomegaly   Musculoskeletal: no digital clubbing, no edema   Skin: warm, no rashes   Groin soft and compressible no evidence of hematoma or ecchymosis.     I have personally reviewed the laboratory, cardiac diagnostic and radiographic testing as outlined below:    Data:    No results for input(s): WBC, HGB, HCT, PLT in the last 72 hours. No results for input(s): NA, K, CL, CO2, BUN, CREATININE, GLU, CALCIUM in the last 72 hours. Invalid input(s): MAGNESIUM   Lab Results   Component Value Date/Time    MG 2.2 2022 06:35 AM     No results for input(s): TSH in the last 72 hours. No results for input(s): INR in the last 72 hours. EK2022: NSR, 73 bpm, , QTc 386, QRS 92. . - Please see scan in Cardiology. Echo 2022:   Findings      Left Ventricle   Left ventricular size is grossly normal.   Ejection fraction is visually estimated at 60%. No regional wall motion abnormalities seen. Normal left ventricular diastolic filling pattern for age. Right Ventricle   Normal right ventricular size and function. Left Atrium   Left atrium is of normal size. Right Atrium   Normal right atrium size. Mitral Valve   Mild mitral regurgitation is present. Tricuspid Valve   Mild tricuspid regurgitation. RVSP is 24 mmHg. Aortic Valve   Aortic valve opens well. Pulmonic Valve   Mild pulmonic regurgitation present. Pericardial Effusion   No evidence of pericardial effusion. Aorta   Aortic root dimension within normal limits. Miscellaneous   Normal Inferior Vena Cava diameter and respiratory variation. Conclusions      Summary   Left ventricular size is grossly normal.   Ejection fraction is visually estimated at 60%. No regional wall motion abnormalities seen. Normal left ventricular diastolic filling pattern for age.       Signature      ----------------------------------------------------------------   Electronically signed by Chula Rosado MD(Interpreting physician)   on 2022 06:58 PM   ----------------------------------------------------------------     M-Mode/2D Measurements & Calculations      LV Diastolic   LV Systolic Dimension: 3 cm  AV Cusp Separation: 2.6 cmLA   Dimension: 5   LV Volume Diastolic: 310.7   Dimension: 3.9 cmAO Root   cm             ml Dimension: 3.2 cm   LV FS:40 %     LV Volume Systolic: 43.3 ml   LV PW          LV EDV/LV EDV Index: 641.3   Diastolic: 0.9 ON/60 OS/N^8CE ESV/LV ESV   cm             Index: 33.7 ml/15ml/ m^2     RV Diastolic Dimension: 3.9 cm   LV PW          EF Calculated: 95.2 %   Systolic: 1.5  LV Mass Index: 66 l/min*m^2  LA/Aorta: 1.22   cm                                          Ascending Aorta: 3.1 cm   Septum                                      LA volume/Index: 26.1 ml   Diastolic: 0.8 LVOT: 2.2 cm                 /20.85ml/m^2   cm                                          RA Area: 17.4 cm^2   Septum   Systolic: 1.5                               IVC Expiration: 2.2 cm   cm    14 day Zio XT: 3/9/2022      EKG 12/17/21:      I have independently reviewed all of the ECGs and rhythm strips per above     Assessment/Plan: This is a 30 y.o. male with a history of obesity who presents with SVT    1. Supraventricular tachycardia  - Diagnosed 12/17/21.  - NCT with short RP.  - History of failed vagal maneuvers. - Terminated with IV Adenosine.  - Recurrent SVT on 5/15/22 which terminated with Vagal maneuver. - SVT ablation, modification of the slow pathway 09/29/2022      Recommendations:  1. Continue OFF Toprol. 2. Encouraged hydration. 3. Follow-up in one year  with Dr. Dotti Aschoff or sooner if needed. I have spent a total of 30 minutes with the patient and the family reviewing the above stated recommendations. And a total of >50% of that time involved face-to-face time providing counseling and or coordination of care with the other providers. Thank you for allowing me to participate in your patient's care. Please call me if there are any questions or concerns.       Electronically signed by ЮЛИЯ Martinez CNP on 11/16/2022 at 8:26 AM   Cardiac Electrophysiology  [address]   [facility] [place]: [place] [facility]  8:26 AM

## 2022-11-16 ENCOUNTER — OFFICE VISIT (OUTPATIENT)
Dept: NON INVASIVE DIAGNOSTICS | Age: 37
End: 2022-11-16
Payer: COMMERCIAL

## 2022-11-16 VITALS
DIASTOLIC BLOOD PRESSURE: 88 MMHG | SYSTOLIC BLOOD PRESSURE: 134 MMHG | HEART RATE: 73 BPM | BODY MASS INDEX: 34.1 KG/M2 | RESPIRATION RATE: 16 BRPM | HEIGHT: 70 IN | WEIGHT: 238.2 LBS

## 2022-11-16 DIAGNOSIS — I47.1 SVT (SUPRAVENTRICULAR TACHYCARDIA) (HCC): Primary | ICD-10-CM

## 2022-11-16 PROCEDURE — 1036F TOBACCO NON-USER: CPT | Performed by: NURSE PRACTITIONER

## 2022-11-16 PROCEDURE — 93000 ELECTROCARDIOGRAM COMPLETE: CPT | Performed by: INTERNAL MEDICINE

## 2022-11-16 PROCEDURE — G8417 CALC BMI ABV UP PARAM F/U: HCPCS | Performed by: NURSE PRACTITIONER

## 2022-11-16 PROCEDURE — 99214 OFFICE O/P EST MOD 30 MIN: CPT | Performed by: NURSE PRACTITIONER

## 2022-11-16 PROCEDURE — G8484 FLU IMMUNIZE NO ADMIN: HCPCS | Performed by: NURSE PRACTITIONER

## 2022-11-16 PROCEDURE — G8427 DOCREV CUR MEDS BY ELIG CLIN: HCPCS | Performed by: NURSE PRACTITIONER

## 2022-11-16 RX ORDER — PANTOPRAZOLE SODIUM 40 MG/1
40 TABLET, DELAYED RELEASE ORAL DAILY
COMMUNITY
Start: 2022-10-28

## 2022-11-16 RX ORDER — TRAZODONE HYDROCHLORIDE 50 MG/1
50 TABLET ORAL NIGHTLY PRN
COMMUNITY
Start: 2022-11-09

## 2023-04-17 ENCOUNTER — HOSPITAL ENCOUNTER (EMERGENCY)
Age: 38
Discharge: HOME | End: 2023-04-17
Attending: EMERGENCY MEDICINE
Payer: COMMERCIAL

## 2023-04-17 VITALS
WEIGHT: 230 LBS | RESPIRATION RATE: 16 BRPM | HEART RATE: 71 BPM | HEIGHT: 71 IN | TEMPERATURE: 97.3 F | BODY MASS INDEX: 32.2 KG/M2 | DIASTOLIC BLOOD PRESSURE: 87 MMHG | SYSTOLIC BLOOD PRESSURE: 150 MMHG | OXYGEN SATURATION: 98 %

## 2023-04-17 DIAGNOSIS — R00.2 PALPITATIONS: Primary | ICD-10-CM

## 2023-04-17 LAB
ANION GAP SERPL CALCULATED.3IONS-SCNC: 8 MMOL/L (ref 7–16)
BUN SERPL-MCNC: 17 MG/DL (ref 6–20)
CALCIUM SERPL-MCNC: 9.2 MG/DL (ref 8.6–10.2)
CHLORIDE SERPL-SCNC: 108 MMOL/L (ref 98–107)
CO2 SERPL-SCNC: 26 MMOL/L (ref 22–29)
CREAT SERPL-MCNC: 0.8 MG/DL (ref 0.7–1.2)
ERYTHROCYTE [DISTWIDTH] IN BLOOD BY AUTOMATED COUNT: 12 FL (ref 11.5–15)
GLUCOSE SERPL-MCNC: 93 MG/DL (ref 74–99)
HCT VFR BLD AUTO: 43.7 % (ref 37–54)
HGB BLD-MCNC: 15.1 G/DL (ref 12.5–16.5)
MAGNESIUM SERPL-MCNC: 2 MG/DL (ref 1.6–2.6)
MCH RBC QN AUTO: 29.8 PG (ref 26–35)
MCHC RBC AUTO-ENTMCNC: 34.6 % (ref 32–34.5)
MCV RBC AUTO: 86.4 FL (ref 80–99.9)
PLATELET # BLD AUTO: 372 E9/L (ref 130–450)
PMV BLD AUTO: 9.1 FL (ref 7–12)
POTASSIUM SERPL-SCNC: 4.2 MMOL/L (ref 3.5–5)
RBC # BLD AUTO: 5.06 E12/L (ref 3.8–5.8)
SODIUM SERPL-SCNC: 142 MMOL/L (ref 132–146)
TROPONIN, HIGH SENSITIVITY: <6 NG/L (ref 0–11)
TSH SERPL-MCNC: 1.02 UIU/ML (ref 0.27–4.2)
WBC # BLD: 5.8 E9/L (ref 4.5–11.5)

## 2023-04-17 PROCEDURE — 85027 COMPLETE CBC AUTOMATED: CPT

## 2023-04-17 PROCEDURE — 83735 ASSAY OF MAGNESIUM: CPT

## 2023-04-17 PROCEDURE — 99284 EMERGENCY DEPT VISIT MOD MDM: CPT

## 2023-04-17 PROCEDURE — 84443 ASSAY THYROID STIM HORMONE: CPT

## 2023-04-17 PROCEDURE — 80048 BASIC METABOLIC PNL TOTAL CA: CPT

## 2023-04-17 PROCEDURE — 84484 ASSAY OF TROPONIN QUANT: CPT

## 2023-04-17 PROCEDURE — 93005 ELECTROCARDIOGRAM TRACING: CPT | Performed by: EMERGENCY MEDICINE

## 2023-04-17 ASSESSMENT — PAIN - FUNCTIONAL ASSESSMENT: PAIN_FUNCTIONAL_ASSESSMENT: NONE - DENIES PAIN

## 2023-04-17 ASSESSMENT — LIFESTYLE VARIABLES
HOW OFTEN DO YOU HAVE A DRINK CONTAINING ALCOHOL: 2-4 TIMES A MONTH
HOW MANY STANDARD DRINKS CONTAINING ALCOHOL DO YOU HAVE ON A TYPICAL DAY: 1 OR 2

## 2023-04-17 NOTE — ED NOTES
Discharge instructions given. Patient verbalizes understanding. No other noted or stated problems at this time. Patient will follow up with primary care.       Kecia Armstrong RN  04/17/23 [id number]

## 2023-04-17 NOTE — ED PROVIDER NOTES
EXAM--------------------------------------      Constitutional/General: Alert and oriented x3, well appearing, non toxic in NAD  Head: NC/AT  Eyes: PERRL, EOMI  Nose:  Nares patent. No congestion or discharge noted. Ears:  TM's intact without erythema or perforation. External canal without swelling  Mouth: Oropharynx clear, handling secretions, no trismus  Neck: Supple, full ROM, no meningeal signs  Pulmonary: Lungs Clear to auscultation bilaterally. No rales or rhonchi or wheezes noted. No retractions. Cardiovascular:  Regular rate and rhythm, no murmurs, gallops, or rubs. 2+ symmetric distal pulses   Chest:  No tenderness, deformity or crepitus  Abdomen: Soft, non tender, non distended, normal bowel sounds  Back:  No tenderness to palpation on the cervical or thoracic or lumbar spine  Extremities: Moves all extremities x 4. Warm and well perfused  Skin: warm and dry without rash  Neurologic: GCS 15, no focal acute neurological deficit  Psych: Normal Affect    EKG:  Normal sinus rhythm with ventricular rate of 64. NJ interval, QRS duration and QT interval within normal range. Normal axis. No ST segment abnormalities to suggest acute ischemia.    ------------------------------ ED COURSE/MEDICAL DECISION MAKING----------------------  Medications - No data to display         Medical Decision Making:    Patient's EKG, physical exam and laboratory showed no significant abnormality. We discharged home. We did have a discussion about stress and his symptoms and he was advised to investigate this further and possibly seek out counseling. Thiazide he does have a history of SVT and was advised to follow-up with his primary care physician as well for possible Holter monitor if symptoms persist.  Instructed to abstain from stimulants such as coffee or tea or energy drinks and to return to the symptoms recur. Patient advised to return to the emergency department should symptoms worsen.  Advised to contact primary care

## 2023-04-18 LAB
EKG ATRIAL RATE: 64 BPM
EKG P AXIS: 25 DEGREES
EKG P-R INTERVAL: 160 MS
EKG Q-T INTERVAL: 402 MS
EKG QRS DURATION: 84 MS
EKG QTC CALCULATION (BAZETT): 414 MS
EKG R AXIS: 37 DEGREES
EKG T AXIS: 46 DEGREES
EKG VENTRICULAR RATE: 64 BPM

## 2023-04-18 PROCEDURE — 93010 ELECTROCARDIOGRAM REPORT: CPT | Performed by: INTERNAL MEDICINE

## 2023-04-19 ENCOUNTER — TELEPHONE (OUTPATIENT)
Dept: NON INVASIVE DIAGNOSTICS | Age: 38
End: 2023-04-19

## 2023-04-19 DIAGNOSIS — I47.1 SVT (SUPRAVENTRICULAR TACHYCARDIA) (HCC): Primary | ICD-10-CM

## 2023-04-19 NOTE — TELEPHONE ENCOUNTER
----- Message from Bruno Montgomery MD sent at 2023  2:47 PM EDT -----  Please schedule 14 day cardiac monitor.  Thanks.  ----- Message -----  From: Jinny wAad  Sent: 2023   1:49 PM EDT  To: Bruno Montgomery MD    Please advise

## 2023-04-21 ENCOUNTER — NURSE ONLY (OUTPATIENT)
Dept: NON INVASIVE DIAGNOSTICS | Age: 38
End: 2023-04-21

## 2023-04-21 NOTE — PROGRESS NOTES
Patient was seen in Office today to have 14 day zio xt monitor put on per Dr. Pravin Rogers. Pt tolerated placement and understood use and return of device number [id number].         Electronically signed by Darlene Hanna MA on 4/21/2023 at 8:42 AM

## 2023-05-17 DIAGNOSIS — I47.1 SVT (SUPRAVENTRICULAR TACHYCARDIA) (HCC): ICD-10-CM

## 2023-05-19 ENCOUNTER — TELEPHONE (OUTPATIENT)
Dept: NON INVASIVE DIAGNOSTICS | Age: 38
End: 2023-05-19

## 2023-05-19 DIAGNOSIS — G47.30 SLEEP DISORDER BREATHING: Primary | ICD-10-CM

## 2023-05-19 NOTE — TELEPHONE ENCOUNTER
Patient notified of results/recommendations as per Dr. Anastasia Rios. The patient verbalized understanding and he wishes to hold off on starting Toprol XL. He did express interest with seeing Sleep medicine (referral placed). Also he reports he will cut down on his caffeine intake and will try to manage his weight.

## 2023-05-19 NOTE — TELEPHONE ENCOUNTER
----- Message from Yousuf Burdick MD sent at 5/17/2023  3:47 PM EDT -----  Cardiac monitor showed brief 7 paroxysmal atrial tachycardia runs occurred, the run with the longest lasting 13 beats which were different from arrhythmia, AVNRT, that he had in the past. Triggered events and reported symptoms were correlated with sinus rhythm, sinus tachycardia, PACs, PVCs and PATs. Advised to avoid caffeine intake and weight loss as well as sleep study to exclude SUZAN. If it bothers him can go back on Toprol XL 25 mg daily. So far we have not seen any recurrent arrhythmia that was ablated.  Thanks.  ----- Message -----  From: Alvarez Loving MA  Sent: 5/17/2023   3:28 PM EDT  To: Yousuf Burdick MD

## 2023-08-24 ENCOUNTER — HOSPITAL ENCOUNTER (OUTPATIENT)
Age: 38
Discharge: HOME | End: 2023-08-26

## 2023-08-30 LAB — SURGICAL PATHOLOGY REPORT: NORMAL

## 2023-09-27 ENCOUNTER — OFFICE VISIT (OUTPATIENT)
Dept: SLEEP MEDICINE | Age: 38
End: 2023-09-27
Payer: COMMERCIAL

## 2023-09-27 VITALS
HEART RATE: 73 BPM | DIASTOLIC BLOOD PRESSURE: 71 MMHG | OXYGEN SATURATION: 99 % | HEIGHT: 71 IN | BODY MASS INDEX: 32.16 KG/M2 | SYSTOLIC BLOOD PRESSURE: 130 MMHG | WEIGHT: 229.72 LBS | RESPIRATION RATE: 14 BRPM

## 2023-09-27 DIAGNOSIS — R06.83 SNORING: Primary | ICD-10-CM

## 2023-09-27 DIAGNOSIS — F51.04 CHRONIC INSOMNIA: ICD-10-CM

## 2023-09-27 DIAGNOSIS — R53.83 OTHER FATIGUE: ICD-10-CM

## 2023-09-27 PROCEDURE — 4004F PT TOBACCO SCREEN RCVD TLK: CPT | Performed by: INTERNAL MEDICINE

## 2023-09-27 PROCEDURE — G8417 CALC BMI ABV UP PARAM F/U: HCPCS | Performed by: INTERNAL MEDICINE

## 2023-09-27 PROCEDURE — G8427 DOCREV CUR MEDS BY ELIG CLIN: HCPCS | Performed by: INTERNAL MEDICINE

## 2023-09-27 PROCEDURE — 99204 OFFICE O/P NEW MOD 45 MIN: CPT | Performed by: INTERNAL MEDICINE

## 2023-09-27 RX ORDER — BUSPIRONE HYDROCHLORIDE 5 MG/1
5 TABLET ORAL 2 TIMES DAILY
COMMUNITY
Start: 2023-08-28

## 2023-09-27 RX ORDER — DULOXETIN HYDROCHLORIDE 30 MG/1
CAPSULE, DELAYED RELEASE ORAL
COMMUNITY
Start: 2023-09-25

## 2023-09-27 ASSESSMENT — ENCOUNTER SYMPTOMS
GASTROINTESTINAL NEGATIVE: 1
ALLERGIC/IMMUNOLOGIC NEGATIVE: 1
RESPIRATORY NEGATIVE: 1
EYES NEGATIVE: 1

## 2023-09-27 ASSESSMENT — SLEEP AND FATIGUE QUESTIONNAIRES
HOW LIKELY ARE YOU TO NOD OFF OR FALL ASLEEP IN A CAR, WHILE STOPPED FOR A FEW MINUTES IN TRAFFIC: 0
ESS TOTAL SCORE: 7
HOW LIKELY ARE YOU TO NOD OFF OR FALL ASLEEP WHILE SITTING INACTIVE IN A PUBLIC PLACE: 1
HOW LIKELY ARE YOU TO NOD OFF OR FALL ASLEEP WHILE WATCHING TV: 1
HOW LIKELY ARE YOU TO NOD OFF OR FALL ASLEEP WHEN YOU ARE A PASSENGER IN A CAR FOR AN HOUR WITHOUT A BREAK: 1
HOW LIKELY ARE YOU TO NOD OFF OR FALL ASLEEP WHILE SITTING QUIETLY AFTER LUNCH WITHOUT ALCOHOL: 1
HOW LIKELY ARE YOU TO NOD OFF OR FALL ASLEEP WHILE SITTING AND READING: 1
NECK CIRCUMFERENCE (INCHES): 16.5
HOW LIKELY ARE YOU TO NOD OFF OR FALL ASLEEP WHILE SITTING AND TALKING TO SOMEONE: 0
HOW LIKELY ARE YOU TO NOD OFF OR FALL ASLEEP WHILE LYING DOWN TO REST IN THE AFTERNOON WHEN CIRCUMSTANCES PERMIT: 2

## 2023-09-27 NOTE — PATIENT INSTRUCTIONS
Say [place]is to Insomnia by Daphne Rain, PhD ($12 on [facility] Go! To Sleep Online Program ($17)    TYPE OR CLICK THIS LINK: https://[employer]. [employer]/products/go-to-sleep-online    A 6-week online course for improving sleep and teaches you to identify and then reframe specific thoughts and behaviors that are interfering with your ability to sleep deeply - all in the comfort and privacy of your own bedroom. Current literature supports upwards of 80% success rates in patients who pursue regular follow-up with cognitive behavioral therapy for insomnia. Virtual Telemedicine Sleep Psychologists    TYPE OR CLICK THIS LINK: behavioralsleep.org, click on \"Telemedicine\", click on \"[place]\" (the psychologists may be from a different state, but they are licensed in [place]). The link above accesses a full list of sleep psychologists who specialize in cognitive behavioral therapy for insomnia. Some of them are located in [place], while others are located throughout [facility]. Each has a tailored approach to help manage chronic insomnia, even if you are trying to get off medications prescribed in the past. Because each insurance has varying coverage, you will need to call the individual sleep psychologist's office directly to see if they will accept your insurance. Helpful iOS Applications    Insomnia  - originally created for [employer] by the [employer] to help veterans who had PTSD and sleeping difficulties. Now, the application has expanded to the general public and has had favorable reviews. The application will take you through sleep diaries/logs and will allow you to work through their 5-week course that can help you consolidate your sleep. In application charges may apply for their more advanced courses. Free CBT-I Website    TYPE OR CLICK THESE LINKS:  1. http://freecbti.com/#/cbti/  2. cbtforinsomnia. com (some resources require extra payment)    Although

## 2023-09-27 NOTE — PROGRESS NOTES
[address] [place] [address],[address] Sleep Medicine    Patient Name: Beata Issa  Age: 41 y.o.   : 1985    Date of Visit: 23        HPI   Beata Issa is a 41 y.o. male with  has a past medical history of SVT (supraventricular tachycardia) ([address]) (2022). who presents as a new patient to Sleep Clinic, referred by Dr. Philippe Christy, for No chief complaint on file. Sally Landry       STOP-BANG:  Snore- yes   Tired- yes  Observed apneas/gasping/choking- yes  High blood pressure- no  BMI > 35kg/sq m - no  Age > 50 - no  Neck circumference > 40 cm - yes  Gender male - yes  Total score 5/8        2023     9:56 AM   Sleep Medicine   Sitting and reading 1   Watching TV 1   Sitting, inactive in a public place (e.g. a theatre or a meeting) 1   As a passenger in a car for an hour without a break 1   Lying down to rest in the afternoon when circumstances permit 2   Sitting and talking to someone 0   Sitting quietly after a lunch without alcohol 1   In a car, while stopped for a few minutes in traffic 0   [place] Sleepiness Score 7   Neck circumference (Inches) 16.5      Ref EP suspected SUZAN  Snores more when in recliner, wakes self up snoring/gasping  Difficulty going to sleep and staying asleep, insomnia since deployed in  [place]  Very tired but lays down and will  be wide awake   May wind down on phone but can take hours to fall asleep   Tried trazodone, was not effective   Currently taking buspar BID and cymbalta in AM  Works in fire dept, 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

## 2023-10-24 ENCOUNTER — HOSPITAL ENCOUNTER (OUTPATIENT)
Dept: SLEEP CENTER | Age: 38
Discharge: HOME | End: 2023-10-24
Payer: COMMERCIAL

## 2023-10-24 DIAGNOSIS — R53.83 OTHER FATIGUE: ICD-10-CM

## 2023-10-24 DIAGNOSIS — R06.83 SNORING: ICD-10-CM

## 2023-10-24 PROCEDURE — 95800 SLP STDY UNATTENDED: CPT

## 2023-10-30 ENCOUNTER — OFFICE VISIT (OUTPATIENT)
Dept: SLEEP MEDICINE | Age: 38
End: 2023-10-30
Payer: COMMERCIAL

## 2023-10-30 VITALS
DIASTOLIC BLOOD PRESSURE: 83 MMHG | RESPIRATION RATE: 18 BRPM | OXYGEN SATURATION: 98 % | HEART RATE: 81 BPM | WEIGHT: 231.48 LBS | HEIGHT: 70 IN | SYSTOLIC BLOOD PRESSURE: 125 MMHG | BODY MASS INDEX: 33.14 KG/M2

## 2023-10-30 DIAGNOSIS — G47.26 SHIFTING SLEEP-WORK SCHEDULE: ICD-10-CM

## 2023-10-30 DIAGNOSIS — F51.04 CHRONIC INSOMNIA: ICD-10-CM

## 2023-10-30 DIAGNOSIS — G47.33 OSA (OBSTRUCTIVE SLEEP APNEA): Primary | ICD-10-CM

## 2023-10-30 PROCEDURE — G8484 FLU IMMUNIZE NO ADMIN: HCPCS | Performed by: INTERNAL MEDICINE

## 2023-10-30 PROCEDURE — 4004F PT TOBACCO SCREEN RCVD TLK: CPT | Performed by: INTERNAL MEDICINE

## 2023-10-30 PROCEDURE — G8427 DOCREV CUR MEDS BY ELIG CLIN: HCPCS | Performed by: INTERNAL MEDICINE

## 2023-10-30 PROCEDURE — 99214 OFFICE O/P EST MOD 30 MIN: CPT | Performed by: INTERNAL MEDICINE

## 2023-10-30 PROCEDURE — G8417 CALC BMI ABV UP PARAM F/U: HCPCS | Performed by: INTERNAL MEDICINE

## 2023-10-30 ASSESSMENT — SLEEP AND FATIGUE QUESTIONNAIRES
HOW LIKELY ARE YOU TO NOD OFF OR FALL ASLEEP WHILE LYING DOWN TO REST IN THE AFTERNOON WHEN CIRCUMSTANCES PERMIT: 3
HOW LIKELY ARE YOU TO NOD OFF OR FALL ASLEEP WHILE WATCHING TV: 1
HOW LIKELY ARE YOU TO NOD OFF OR FALL ASLEEP WHEN YOU ARE A PASSENGER IN A CAR FOR AN HOUR WITHOUT A BREAK: 1
ESS TOTAL SCORE: 8
HOW LIKELY ARE YOU TO NOD OFF OR FALL ASLEEP WHILE SITTING AND TALKING TO SOMEONE: 0
HOW LIKELY ARE YOU TO NOD OFF OR FALL ASLEEP WHILE SITTING INACTIVE IN A PUBLIC PLACE: 2
HOW LIKELY ARE YOU TO NOD OFF OR FALL ASLEEP WHILE SITTING QUIETLY AFTER LUNCH WITHOUT ALCOHOL: 0
HOW LIKELY ARE YOU TO NOD OFF OR FALL ASLEEP WHILE SITTING AND READING: 1
HOW LIKELY ARE YOU TO NOD OFF OR FALL ASLEEP IN A CAR, WHILE STOPPED FOR A FEW MINUTES IN TRAFFIC: 0

## 2023-10-30 ASSESSMENT — ENCOUNTER SYMPTOMS
ALLERGIC/IMMUNOLOGIC NEGATIVE: 1
EYES NEGATIVE: 1
GASTROINTESTINAL NEGATIVE: 1
RESPIRATORY NEGATIVE: 1

## 2023-10-30 NOTE — PATIENT INSTRUCTIONS
[facility] Go! To Sleep Online Program ($40)    WEBSITE: https://[employer]. [employer]/products/go-to-sleep-online    A 6-week online course for improving sleep and teaches you to identify and then reframe specific thoughts and behaviors that are interfering with your ability to sleep deeply - all in the comfort and privacy of your own bedroom. Current literature supports upwards of 80% success rates in patients who pursue regular follow-up with cognitive behavioral therapy for insomnia.

## 2024-01-31 ENCOUNTER — OFFICE VISIT (OUTPATIENT)
Dept: SLEEP MEDICINE | Age: 39
End: 2024-01-31
Payer: COMMERCIAL

## 2024-01-31 VITALS
SYSTOLIC BLOOD PRESSURE: 131 MMHG | BODY MASS INDEX: 33.58 KG/M2 | OXYGEN SATURATION: 98 % | HEART RATE: 106 BPM | RESPIRATION RATE: 17 BRPM | HEIGHT: 71 IN | DIASTOLIC BLOOD PRESSURE: 93 MMHG | WEIGHT: 239.86 LBS

## 2024-01-31 DIAGNOSIS — F51.04 CHRONIC INSOMNIA: ICD-10-CM

## 2024-01-31 DIAGNOSIS — G47.33 OSA (OBSTRUCTIVE SLEEP APNEA): Primary | ICD-10-CM

## 2024-01-31 PROCEDURE — G8428 CUR MEDS NOT DOCUMENT: HCPCS | Performed by: INTERNAL MEDICINE

## 2024-01-31 PROCEDURE — G8417 CALC BMI ABV UP PARAM F/U: HCPCS | Performed by: INTERNAL MEDICINE

## 2024-01-31 PROCEDURE — G8484 FLU IMMUNIZE NO ADMIN: HCPCS | Performed by: INTERNAL MEDICINE

## 2024-01-31 PROCEDURE — 4004F PT TOBACCO SCREEN RCVD TLK: CPT | Performed by: INTERNAL MEDICINE

## 2024-01-31 PROCEDURE — 99214 OFFICE O/P EST MOD 30 MIN: CPT | Performed by: INTERNAL MEDICINE

## 2024-01-31 RX ORDER — DOXEPIN 6 MG/1
6 TABLET, FILM COATED ORAL NIGHTLY
Qty: 30 TABLET | Refills: 2 | Status: SHIPPED | OUTPATIENT
Start: 2024-01-31

## 2024-01-31 ASSESSMENT — SLEEP AND FATIGUE QUESTIONNAIRES
HOW LIKELY ARE YOU TO NOD OFF OR FALL ASLEEP IN A CAR, WHILE STOPPED FOR A FEW MINUTES IN TRAFFIC: 0
HOW LIKELY ARE YOU TO NOD OFF OR FALL ASLEEP WHILE SITTING INACTIVE IN A PUBLIC PLACE: 1
ESS TOTAL SCORE: 9
HOW LIKELY ARE YOU TO NOD OFF OR FALL ASLEEP WHILE WATCHING TV: 2
HOW LIKELY ARE YOU TO NOD OFF OR FALL ASLEEP WHILE LYING DOWN TO REST IN THE AFTERNOON WHEN CIRCUMSTANCES PERMIT: 3
HOW LIKELY ARE YOU TO NOD OFF OR FALL ASLEEP WHILE SITTING QUIETLY AFTER LUNCH WITHOUT ALCOHOL: 0
HOW LIKELY ARE YOU TO NOD OFF OR FALL ASLEEP WHILE SITTING AND READING: 2
HOW LIKELY ARE YOU TO NOD OFF OR FALL ASLEEP WHEN YOU ARE A PASSENGER IN A CAR FOR AN HOUR WITHOUT A BREAK: 1
HOW LIKELY ARE YOU TO NOD OFF OR FALL ASLEEP WHILE SITTING AND TALKING TO SOMEONE: 0

## 2024-01-31 NOTE — PROGRESS NOTES
[facility]    Patient Name: Tuan Roy  Age: 39 y.o.   : 1985    Date of Visit:  2024         HPI   Tuan Roy is a 39 y.o. male with  has a past medical history of SVT (supraventricular tachycardia) (2022).     Struggled with CPAP use then got sick/vacation stopped using   Felt slept better without it  Hybrid mask was better than nasal  Sleep is restless, repositions often  Sleep schedule irregular due to work         Prev hpi:  F/u HSAT which showed moderate SUZAN  Kids in bed ~ 8:30p then doing chores around the house, unwinds in living room watching tv/phone/reading   To  bed ~ 10:30p  Up around 6:30a  ~1 arousal during the night, has improved lately  At work everyone will nap after lunch time   Has no issues falling to sleep at work but at home when gets into bed mind racing/brain won't turn off  Has been practicing stimulus control, noticed some improvement in sleep onset but often is not sleepy at bedtime if had taken nap that day, or if was up for 24h shift is very very tired early evening            Prev hpi  STOP-BANG:  Snore- yes   Tired- yes  Observed apneas/gasping/choking- yes  High blood pressure- no  BMI > 35kg/sq m - no  Age > 50 - no  Neck circumference > 40 cm - yes  Gender male - yes  Total score 5/8        2024     2:26 PM 10/30/2023     3:33 PM 2023     9:56 AM   Sleep Medicine   Sitting and reading 2 1 1   Watching TV 2 1 1   Sitting, inactive in a public place (e.g. a theatre or a meeting) 1 2 1   As a passenger in a car for an hour without a break 1 1 1   Lying down to rest in the afternoon when circumstances permit 3 3 2   Sitting and talking to someone 0 0 0   Sitting quietly after a lunch without alcohol 0 0 1   In a car, while stopped for a few minutes in traffic 0 0 0   [place] Sleepiness Score 9 8 7   Neck circumference (Inches)   16.5      Ref EP suspected SUZAN  Snores more when in recliner, wakes self up snoring/gasping  Difficulty

## 2024-02-06 ENCOUNTER — TELEPHONE (OUTPATIENT)
Age: 39
End: 2024-02-06

## 2024-02-06 NOTE — TELEPHONE ENCOUNTER
Patient called and asked if doctor could type up letter for [facility] stating that his condition GERD started during active  duty.     Also pt needs copies of his endoscopy report done by Dr. Caraballo. And medical records in regards to GERD start date Sept 26 2014.

## 2024-03-27 RX ORDER — BUSPIRONE HYDROCHLORIDE 5 MG/1
5 TABLET ORAL 2 TIMES DAILY
Qty: 60 TABLET | Refills: 5 | Status: SHIPPED | OUTPATIENT
Start: 2024-03-27

## 2024-05-14 RX ORDER — DOXEPIN 6 MG/1
1 TABLET, FILM COATED ORAL NIGHTLY
Qty: 30 TABLET | Refills: 2 | Status: SHIPPED | OUTPATIENT
Start: 2024-05-14

## 2024-06-15 ENCOUNTER — HOSPITAL ENCOUNTER (EMERGENCY)
Age: 39
Discharge: HOME | End: 2024-06-15
Attending: STUDENT IN AN ORGANIZED HEALTH CARE EDUCATION/TRAINING PROGRAM
Payer: COMMERCIAL

## 2024-06-15 ENCOUNTER — APPOINTMENT (OUTPATIENT)
Dept: GENERAL RADIOLOGY | Age: 39
End: 2024-06-15
Payer: COMMERCIAL

## 2024-06-15 VITALS
RESPIRATION RATE: 18 BRPM | OXYGEN SATURATION: 97 % | WEIGHT: 230 LBS | HEART RATE: 86 BPM | TEMPERATURE: 97.5 F | BODY MASS INDEX: 32.08 KG/M2 | SYSTOLIC BLOOD PRESSURE: 127 MMHG | DIASTOLIC BLOOD PRESSURE: 86 MMHG

## 2024-06-15 DIAGNOSIS — Z71.1 MEDICAL CONDITION NOT DEMONSTRATED: Primary | ICD-10-CM

## 2024-06-15 LAB
ALBUMIN SERPL-MCNC: 4.6 G/DL (ref 3.5–5.2)
ALP SERPL-CCNC: 99 U/L (ref 40–129)
ALT SERPL-CCNC: 40 U/L (ref 0–40)
ANION GAP SERPL CALCULATED.3IONS-SCNC: 9 MMOL/L (ref 7–16)
AST SERPL-CCNC: 26 U/L (ref 0–39)
BASOPHILS # BLD: 0.05 K/UL (ref 0–0.2)
BASOPHILS NFR BLD: 1 % (ref 0–2)
BILIRUB SERPL-MCNC: 0.5 MG/DL (ref 0–1.2)
BUN SERPL-MCNC: 22 MG/DL (ref 6–20)
CALCIUM SERPL-MCNC: 9.4 MG/DL (ref 8.6–10.2)
CHLORIDE SERPL-SCNC: 103 MMOL/L (ref 98–107)
CO2 SERPL-SCNC: 24 MMOL/L (ref 22–29)
CREAT SERPL-MCNC: 1 MG/DL (ref 0.7–1.2)
EKG ATRIAL RATE: 64 BPM
EKG P AXIS: 14 DEGREES
EKG P-R INTERVAL: 146 MS
EKG Q-T INTERVAL: 404 MS
EKG QRS DURATION: 82 MS
EKG QTC CALCULATION (BAZETT): 416 MS
EKG R AXIS: 30 DEGREES
EKG T AXIS: 65 DEGREES
EKG VENTRICULAR RATE: 64 BPM
EOSINOPHIL # BLD: 0.09 K/UL (ref 0.05–0.5)
EOSINOPHILS RELATIVE PERCENT: 1 % (ref 0–6)
ERYTHROCYTE [DISTWIDTH] IN BLOOD BY AUTOMATED COUNT: 12 % (ref 11.5–15)
GFR, ESTIMATED: >90 ML/MIN/1.73M2
GLUCOSE SERPL-MCNC: 113 MG/DL (ref 74–99)
HCT VFR BLD AUTO: 45 % (ref 37–54)
HGB BLD-MCNC: 15 G/DL (ref 12.5–16.5)
IMM GRANULOCYTES # BLD AUTO: <0.03 K/UL (ref 0–0.58)
IMM GRANULOCYTES NFR BLD: 0 % (ref 0–5)
LYMPHOCYTES NFR BLD: 1.67 K/UL (ref 1.5–4)
LYMPHOCYTES RELATIVE PERCENT: 23 % (ref 20–42)
MCH RBC QN AUTO: 29.2 PG (ref 26–35)
MCHC RBC AUTO-ENTMCNC: 33.3 G/DL (ref 32–34.5)
MCV RBC AUTO: 87.7 FL (ref 80–99.9)
MONOCYTES NFR BLD: 0.37 K/UL (ref 0.1–0.95)
MONOCYTES NFR BLD: 5 % (ref 2–12)
NEUTROPHILS NFR BLD: 70 % (ref 43–80)
NEUTS SEG NFR BLD: 5.03 K/UL (ref 1.8–7.3)
PLATELET # BLD AUTO: 362 K/UL (ref 130–450)
PMV BLD AUTO: 9.5 FL (ref 7–12)
POTASSIUM SERPL-SCNC: 4.1 MMOL/L (ref 3.5–5)
PROT SERPL-MCNC: 7.6 G/DL (ref 6.4–8.3)
RBC # BLD AUTO: 5.13 M/UL (ref 3.8–5.8)
SODIUM SERPL-SCNC: 136 MMOL/L (ref 132–146)
TROPONIN I SERPL HS-MCNC: <6 NG/L (ref 0–11)
WBC OTHER # BLD: 7.2 K/UL (ref 4.5–11.5)

## 2024-06-15 PROCEDURE — 85025 COMPLETE CBC W/AUTO DIFF WBC: CPT

## 2024-06-15 PROCEDURE — 84484 ASSAY OF TROPONIN QUANT: CPT

## 2024-06-15 PROCEDURE — 99285 EMERGENCY DEPT VISIT HI MDM: CPT

## 2024-06-15 PROCEDURE — 93005 ELECTROCARDIOGRAM TRACING: CPT | Performed by: STUDENT IN AN ORGANIZED HEALTH CARE EDUCATION/TRAINING PROGRAM

## 2024-06-15 PROCEDURE — 80053 COMPREHEN METABOLIC PANEL: CPT

## 2024-06-15 PROCEDURE — 71045 X-RAY EXAM CHEST 1 VIEW: CPT

## 2024-06-15 ASSESSMENT — PAIN - FUNCTIONAL ASSESSMENT: PAIN_FUNCTIONAL_ASSESSMENT: NONE - DENIES PAIN

## 2024-06-15 NOTE — DISCHARGE INSTRUCTIONS
Follow-up with primary care doctor  If you notice any new worrisome symptoms please return to emergency department for

## 2024-06-16 NOTE — ED PROVIDER NOTES
follow-up with primary care doctor strict return precautions discussed all questions answered patient agreement plan of care discharged in stable condition.    FINAL IMPRESSION      1. Medical condition not demonstrated          DISPOSITION/PLAN     DISPOSITION Decision To Discharge 06/15/2024 04:04:24 PM    PATIENT REFERRED TO:  Reece Zapata MD  [address] [address]  [phone]    Schedule an appointment as soon as possible for a visit       [facility] Emergency Department  [facility] [address]  [phone]  Schedule an appointment as soon as possible for a visit         DISCHARGE MEDICATIONS:  Discharge Medication List as of 6/15/2024  4:07 PM          DISCONTINUED MEDICATIONS:  Discharge Medication List as of 6/15/2024  4:07 PM               (Please note that portions of this note were completed with a voice recognition program.  Efforts were made to edit the dictations but occasionally words are mis-transcribed.)    Jesus Nino DO (electronically signed)       Jesus Nino DO  06/16/24 [id number]

## 2024-06-17 LAB
EKG ATRIAL RATE: 64 BPM
EKG P AXIS: 14 DEGREES
EKG P-R INTERVAL: 146 MS
EKG Q-T INTERVAL: 404 MS
EKG QRS DURATION: 82 MS
EKG QTC CALCULATION (BAZETT): 416 MS
EKG R AXIS: 30 DEGREES
EKG T AXIS: 65 DEGREES
EKG VENTRICULAR RATE: 64 BPM

## 2024-06-17 PROCEDURE — 93010 ELECTROCARDIOGRAM REPORT: CPT | Performed by: INTERNAL MEDICINE

## 2024-07-23 ENCOUNTER — OFFICE VISIT (OUTPATIENT)
Age: 39
End: 2024-07-23
Payer: COMMERCIAL

## 2024-07-23 VITALS
WEIGHT: 239.2 LBS | BODY MASS INDEX: 33.49 KG/M2 | SYSTOLIC BLOOD PRESSURE: 112 MMHG | HEIGHT: 71 IN | RESPIRATION RATE: 18 BRPM | TEMPERATURE: 98 F | HEART RATE: 75 BPM | DIASTOLIC BLOOD PRESSURE: 76 MMHG | OXYGEN SATURATION: 97 %

## 2024-07-23 DIAGNOSIS — G47.33 OSA (OBSTRUCTIVE SLEEP APNEA): ICD-10-CM

## 2024-07-23 DIAGNOSIS — R53.83 OTHER FATIGUE: Primary | ICD-10-CM

## 2024-07-23 DIAGNOSIS — E29.1 TESTICULAR HYPOFUNCTION: ICD-10-CM

## 2024-07-23 DIAGNOSIS — Z12.5 SCREENING FOR PROSTATE CANCER: ICD-10-CM

## 2024-07-23 DIAGNOSIS — K21.00 GASTROESOPHAGEAL REFLUX DISEASE WITH ESOPHAGITIS WITHOUT HEMORRHAGE: ICD-10-CM

## 2024-07-23 PROBLEM — N50.9 DISORDER OF MALE GENITAL ORGANS: Status: RESOLVED | Noted: 2022-05-31 | Resolved: 2024-07-23

## 2024-07-23 PROCEDURE — G8427 DOCREV CUR MEDS BY ELIG CLIN: HCPCS | Performed by: FAMILY MEDICINE

## 2024-07-23 PROCEDURE — G8417 CALC BMI ABV UP PARAM F/U: HCPCS | Performed by: FAMILY MEDICINE

## 2024-07-23 PROCEDURE — 99214 OFFICE O/P EST MOD 30 MIN: CPT | Performed by: FAMILY MEDICINE

## 2024-07-23 PROCEDURE — 1036F TOBACCO NON-USER: CPT | Performed by: FAMILY MEDICINE

## 2024-07-25 ASSESSMENT — ENCOUNTER SYMPTOMS
RESPIRATORY NEGATIVE: 1
GASTROINTESTINAL NEGATIVE: 1

## 2024-07-25 NOTE — PROGRESS NOTES
Tuan Roy (:  1985) is a 39 y.o. male,Established patient, here for evaluation of the following chief complaint(s):  6 Month Follow-Up      Assessment & Plan   1. Other fatigue  -     CBC with Auto Differential; Future  -     Comprehensive Metabolic Panel; Future  -     TSH; Future  -     Testosterone; Future  2. Testicular hypofunction  -     Testosterone; Future  3. SUZAN (obstructive sleep apnea) did discuss possibly adding Provigil for fatigue.  Patient wants to wait for the results of the above labs first.  Continue follow-up with sleep medicine  4. Screening for prostate cancer  5. Gastroesophageal reflux disease with esophagitis without hemorrhage continue current medication continue close follow-up with gastroenterology      Return in about 6 months (around 2025).       Subjective   HPI  39-year-old comes in for 6-month fasting checkup.  He has been treated for heartburn depression anxiety.  He also has been diagnosed with obstructive sleep apnea but is unable to tolerate the CPAP very well.  He does see a sleep medicine doctor who recently prescribed doxepin for sleep but he is not taking it.  States he feels tired even after sleeping through the night.  He states he is doing well with counseling.  Heartburns been mostly controlled with Protonix.  Recently went from twice a day to once a day.  He follows up regularly with gastroenterology.  Review of Systems   Constitutional:  Positive for fatigue. Negative for activity change and appetite change.   Respiratory: Negative.     Cardiovascular: Negative.    Gastrointestinal: Negative.    Genitourinary: Negative.    Skin: Negative.    Psychiatric/Behavioral:  Positive for dysphoric mood. The patient is nervous/anxious.           Objective /76   Pulse 75   Temp 98 °F (36.7 °C)   Resp 18   Ht 1.803 m (5' 11\")   Wt 108.5 kg (239 lb 3.2 oz)   SpO2 97%   BMI 33.36 kg/m²    Physical Exam  Constitutional:       General: He is not in

## 2024-08-09 RX ORDER — BUSPIRONE HYDROCHLORIDE 5 MG/1
5 TABLET ORAL 2 TIMES DAILY
Qty: 180 TABLET | Refills: 3 | Status: SHIPPED | OUTPATIENT
Start: 2024-08-09

## 2024-08-09 NOTE — TELEPHONE ENCOUNTER
Patient called for medication refill    Requested Prescriptions     Pending Prescriptions Disp Refills    busPIRone (BUSPAR) 5 MG tablet 180 tablet 3     Sig: Take 1 tablet by mouth 2 times daily     Last Appt: 7/23/2024   Next Appt: 3/10/2025

## 2024-08-21 ENCOUNTER — OFFICE VISIT (OUTPATIENT)
Dept: NON INVASIVE DIAGNOSTICS | Age: 39
End: 2024-08-21
Payer: COMMERCIAL

## 2024-08-21 VITALS
SYSTOLIC BLOOD PRESSURE: 118 MMHG | RESPIRATION RATE: 16 BRPM | HEIGHT: 70 IN | WEIGHT: 239.2 LBS | DIASTOLIC BLOOD PRESSURE: 80 MMHG | BODY MASS INDEX: 34.24 KG/M2 | HEART RATE: 81 BPM

## 2024-08-21 DIAGNOSIS — I47.10 SVT (SUPRAVENTRICULAR TACHYCARDIA) (HCC): Primary | ICD-10-CM

## 2024-08-21 PROCEDURE — 99215 OFFICE O/P EST HI 40 MIN: CPT | Performed by: INTERNAL MEDICINE

## 2024-08-21 PROCEDURE — 93000 ELECTROCARDIOGRAM COMPLETE: CPT | Performed by: INTERNAL MEDICINE

## 2024-08-21 PROCEDURE — G8427 DOCREV CUR MEDS BY ELIG CLIN: HCPCS | Performed by: INTERNAL MEDICINE

## 2024-08-21 PROCEDURE — G8417 CALC BMI ABV UP PARAM F/U: HCPCS | Performed by: INTERNAL MEDICINE

## 2024-08-21 PROCEDURE — 1036F TOBACCO NON-USER: CPT | Performed by: INTERNAL MEDICINE

## 2024-08-22 PROBLEM — Z12.5 SCREENING FOR PROSTATE CANCER: Status: RESOLVED | Noted: 2024-07-23 | Resolved: 2024-08-22

## 2024-08-23 RX ORDER — DOXEPIN 6 MG/1
1 TABLET, FILM COATED ORAL
Qty: 30 TABLET | Refills: 2 | OUTPATIENT
Start: 2024-08-23

## 2024-08-27 ENCOUNTER — HOSPITAL ENCOUNTER (OUTPATIENT)
Age: 39
Discharge: HOME | End: 2024-08-27
Payer: COMMERCIAL

## 2024-08-27 DIAGNOSIS — R53.83 OTHER FATIGUE: ICD-10-CM

## 2024-08-27 DIAGNOSIS — E29.1 TESTICULAR HYPOFUNCTION: ICD-10-CM

## 2024-08-27 LAB
ALBUMIN SERPL-MCNC: 4.2 G/DL (ref 3.5–5.2)
ALP SERPL-CCNC: 92 U/L (ref 40–129)
ALT SERPL-CCNC: 23 U/L (ref 0–40)
ANION GAP SERPL CALCULATED.3IONS-SCNC: 11 MMOL/L (ref 7–16)
AST SERPL-CCNC: 20 U/L (ref 0–39)
BASOPHILS # BLD: 0.05 K/UL (ref 0–0.2)
BASOPHILS NFR BLD: 1 % (ref 0–2)
BILIRUB SERPL-MCNC: 0.4 MG/DL (ref 0–1.2)
BUN SERPL-MCNC: 21 MG/DL (ref 6–20)
CALCIUM SERPL-MCNC: 9.5 MG/DL (ref 8.6–10.2)
CHLORIDE SERPL-SCNC: 107 MMOL/L (ref 98–107)
CO2 SERPL-SCNC: 23 MMOL/L (ref 22–29)
CREAT SERPL-MCNC: 1 MG/DL (ref 0.7–1.2)
EOSINOPHIL # BLD: 0.09 K/UL (ref 0.05–0.5)
EOSINOPHILS RELATIVE PERCENT: 1 % (ref 0–6)
ERYTHROCYTE [DISTWIDTH] IN BLOOD BY AUTOMATED COUNT: 11.8 % (ref 11.5–15)
GFR, ESTIMATED: >90 ML/MIN/1.73M2
GLUCOSE SERPL-MCNC: 97 MG/DL (ref 74–99)
HCT VFR BLD AUTO: 43.7 % (ref 37–54)
HGB BLD-MCNC: 14.8 G/DL (ref 12.5–16.5)
IMM GRANULOCYTES # BLD AUTO: <0.03 K/UL (ref 0–0.58)
IMM GRANULOCYTES NFR BLD: 0 % (ref 0–5)
LYMPHOCYTES NFR BLD: 1.88 K/UL (ref 1.5–4)
LYMPHOCYTES RELATIVE PERCENT: 30 % (ref 20–42)
MCH RBC QN AUTO: 30 PG (ref 26–35)
MCHC RBC AUTO-ENTMCNC: 33.9 G/DL (ref 32–34.5)
MCV RBC AUTO: 88.6 FL (ref 80–99.9)
MONOCYTES NFR BLD: 0.34 K/UL (ref 0.1–0.95)
MONOCYTES NFR BLD: 6 % (ref 2–12)
NEUTROPHILS NFR BLD: 62 % (ref 43–80)
NEUTS SEG NFR BLD: 3.85 K/UL (ref 1.8–7.3)
PLATELET # BLD AUTO: 381 K/UL (ref 130–450)
PMV BLD AUTO: 9.5 FL (ref 7–12)
POTASSIUM SERPL-SCNC: 4.4 MMOL/L (ref 3.5–5)
PROT SERPL-MCNC: 7.5 G/DL (ref 6.4–8.3)
RBC # BLD AUTO: 4.93 M/UL (ref 3.8–5.8)
SODIUM SERPL-SCNC: 141 MMOL/L (ref 132–146)
TESTOST SERPL-MCNC: 298 NG/DL (ref 249–836)
TSH SERPL DL<=0.05 MIU/L-ACNC: 0.84 UIU/ML (ref 0.27–4.2)
WBC OTHER # BLD: 6.2 K/UL (ref 4.5–11.5)

## 2024-08-27 PROCEDURE — 36415 COLL VENOUS BLD VENIPUNCTURE: CPT

## 2024-08-27 PROCEDURE — 84403 ASSAY OF TOTAL TESTOSTERONE: CPT

## 2024-08-27 PROCEDURE — 84443 ASSAY THYROID STIM HORMONE: CPT

## 2024-08-27 PROCEDURE — 85025 COMPLETE CBC W/AUTO DIFF WBC: CPT

## 2024-08-27 PROCEDURE — 80053 COMPREHEN METABOLIC PANEL: CPT

## 2024-10-25 RX ORDER — PANTOPRAZOLE SODIUM 40 MG/1
40 TABLET, DELAYED RELEASE ORAL 2 TIMES DAILY
Qty: 180 TABLET | Refills: 3 | Status: SHIPPED | OUTPATIENT
Start: 2024-10-25

## 2024-10-25 NOTE — TELEPHONE ENCOUNTER
Name of Medication(s) Requested:  Requested Prescriptions     Pending Prescriptions Disp Refills    pantoprazole (PROTONIX) 40 MG tablet [Pharmacy Med Name: PANTOPRAZOLE SOD DR 40 MG TAB] 180 tablet 3     Sig: TAKE 1 TABLET BY MOUTH TWICE A DAY       Medication is on current medication list Yes    Dosage and directions were verified? Yes    Quantity verified: 90 day supply     Pharmacy Verified?  Yes    Last Appointment:  7/23/2024    Future appts:  Future Appointments   Date Time Provider Department Center   10/28/2024  3:00 PM Angelique Conn DO [facility]   3/10/2025  9:00 AM Reece Zapata MD [facility]        (If no appt send self scheduling link. .REFILLAPPT)  Scheduling request sent?     [x] Yes  [] No    Does patient need updated?  [] Yes  [x] No

## 2024-10-28 ENCOUNTER — TELEMEDICINE (OUTPATIENT)
Dept: SLEEP MEDICINE | Age: 39
End: 2024-10-28
Payer: COMMERCIAL

## 2024-10-28 DIAGNOSIS — G47.26 SHIFTING SLEEP-WORK SCHEDULE: ICD-10-CM

## 2024-10-28 DIAGNOSIS — F51.04 CHRONIC INSOMNIA: ICD-10-CM

## 2024-10-28 DIAGNOSIS — R53.83 OTHER FATIGUE: ICD-10-CM

## 2024-10-28 DIAGNOSIS — G47.33 OSA (OBSTRUCTIVE SLEEP APNEA): Primary | ICD-10-CM

## 2024-10-28 PROCEDURE — 1036F TOBACCO NON-USER: CPT | Performed by: INTERNAL MEDICINE

## 2024-10-28 PROCEDURE — G8484 FLU IMMUNIZE NO ADMIN: HCPCS | Performed by: INTERNAL MEDICINE

## 2024-10-28 PROCEDURE — G8417 CALC BMI ABV UP PARAM F/U: HCPCS | Performed by: INTERNAL MEDICINE

## 2024-10-28 PROCEDURE — 99214 OFFICE O/P EST MOD 30 MIN: CPT | Performed by: INTERNAL MEDICINE

## 2024-10-28 PROCEDURE — G8427 DOCREV CUR MEDS BY ELIG CLIN: HCPCS | Performed by: INTERNAL MEDICINE

## 2024-10-28 RX ORDER — MIRTAZAPINE 15 MG/1
15 TABLET, FILM COATED ORAL NIGHTLY
Qty: 30 TABLET | Refills: 3 | Status: SHIPPED | OUTPATIENT
Start: 2024-10-28

## 2024-10-28 ASSESSMENT — SLEEP AND FATIGUE QUESTIONNAIRES
HOW LIKELY ARE YOU TO NOD OFF OR FALL ASLEEP WHILE LYING DOWN TO REST IN THE AFTERNOON WHEN CIRCUMSTANCES PERMIT: HIGH CHANCE OF DOZING
HOW LIKELY ARE YOU TO NOD OFF OR FALL ASLEEP WHILE WATCHING TV: MODERATE CHANCE OF DOZING
HOW LIKELY ARE YOU TO NOD OFF OR FALL ASLEEP IN A CAR, WHILE STOPPED FOR A FEW MINUTES IN TRAFFIC: WOULD NEVER DOZE
HOW LIKELY ARE YOU TO NOD OFF OR FALL ASLEEP WHILE SITTING AND TALKING TO SOMEONE: WOULD NEVER DOZE
HOW LIKELY ARE YOU TO NOD OFF OR FALL ASLEEP WHEN YOU ARE A PASSENGER IN A CAR FOR AN HOUR WITHOUT A BREAK: SLIGHT CHANCE OF DOZING
HOW LIKELY ARE YOU TO NOD OFF OR FALL ASLEEP WHILE SITTING AND READING: MODERATE CHANCE OF DOZING
HOW LIKELY ARE YOU TO NOD OFF OR FALL ASLEEP WHILE SITTING INACTIVE IN A PUBLIC PLACE: WOULD NEVER DOZE
ESS TOTAL SCORE: 10
HOW LIKELY ARE YOU TO NOD OFF OR FALL ASLEEP WHILE SITTING QUIETLY AFTER LUNCH WITHOUT ALCOHOL: MODERATE CHANCE OF DOZING

## 2024-10-28 NOTE — PROGRESS NOTES
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    KNEE SURGERY Left 2017    ACL, miniscus repair    VASECTOMY  05/04/2022          Soc Hx:  Social History     Tobacco Use    Smoking status: Former     Current packs/day: 0.25     Average packs/day: 0.3 packs/day for 14.0 years (3.5 ttl pk-yrs)     Types: Cigarettes    Smokeless tobacco: Never   Vaping Use    Vaping status: Every Day    Last attempt to quit: 3/9/2020    Substances: Nicotine    Devices: Disposable   Substance Use Topics    Alcohol use: Yes     Comment: occasional    Drug use: No        Fam Hx:  Family History   Problem Relation Age of Onset    Diabetes Father         Current Outpatient Medications   Medication Sig Dispense Refill    mirtazapine (REMERON) 15 MG tablet Take 1 tablet by mouth nightly 30 tablet 3    pantoprazole (PROTONIX) 40 MG tablet TAKE 1 TABLET BY MOUTH TWICE A  tablet 3    busPIRone (BUSPAR) 5 MG tablet Take 1 tablet by mouth 2 times daily 180 tablet 3    DULoxetine (CYMBALTA) 30 MG extended release capsule Take 1 capsule by mouth daily       No current facility-administered medications for this visit.        Review of Systems  (Sleep ROS above)  Review of Systems   Constitutional:  Positive for fatigue.   HENT: Negative.     Eyes: Negative.    Respiratory: Negative.     Cardiovascular: Negative.    Gastrointestinal: Negative.    Endocrine: Negative.    Genitourinary: Negative.    Skin: Negative.    Allergic/Immunologic: Negative.    Neurological: Negative.    Hematological: Negative.    Psychiatric/Behavioral:  Positive for sleep disturbance.

## 2024-12-16 ENCOUNTER — TELEMEDICINE (OUTPATIENT)
Dept: SLEEP MEDICINE | Age: 39
End: 2024-12-16
Payer: COMMERCIAL

## 2024-12-16 DIAGNOSIS — F51.04 CHRONIC INSOMNIA: ICD-10-CM

## 2024-12-16 DIAGNOSIS — G47.26 SHIFTING SLEEP-WORK SCHEDULE: ICD-10-CM

## 2024-12-16 DIAGNOSIS — R53.83 OTHER FATIGUE: ICD-10-CM

## 2024-12-16 DIAGNOSIS — G47.33 OSA (OBSTRUCTIVE SLEEP APNEA): Primary | ICD-10-CM

## 2024-12-16 PROCEDURE — G8484 FLU IMMUNIZE NO ADMIN: HCPCS | Performed by: INTERNAL MEDICINE

## 2024-12-16 PROCEDURE — G8428 CUR MEDS NOT DOCUMENT: HCPCS | Performed by: INTERNAL MEDICINE

## 2024-12-16 PROCEDURE — G8417 CALC BMI ABV UP PARAM F/U: HCPCS | Performed by: INTERNAL MEDICINE

## 2024-12-16 PROCEDURE — 1036F TOBACCO NON-USER: CPT | Performed by: INTERNAL MEDICINE

## 2024-12-16 PROCEDURE — 99214 OFFICE O/P EST MOD 30 MIN: CPT | Performed by: INTERNAL MEDICINE

## 2024-12-16 ASSESSMENT — SLEEP AND FATIGUE QUESTIONNAIRES
HOW LIKELY ARE YOU TO NOD OFF OR FALL ASLEEP WHILE SITTING AND READING: HIGH CHANCE OF DOZING
ESS TOTAL SCORE: 13
HOW LIKELY ARE YOU TO NOD OFF OR FALL ASLEEP WHEN YOU ARE A PASSENGER IN A CAR FOR AN HOUR WITHOUT A BREAK: MODERATE CHANCE OF DOZING
HOW LIKELY ARE YOU TO NOD OFF OR FALL ASLEEP WHILE LYING DOWN TO REST IN THE AFTERNOON WHEN CIRCUMSTANCES PERMIT: HIGH CHANCE OF DOZING
HOW LIKELY ARE YOU TO NOD OFF OR FALL ASLEEP WHILE SITTING INACTIVE IN A PUBLIC PLACE: SLIGHT CHANCE OF DOZING
HOW LIKELY ARE YOU TO NOD OFF OR FALL ASLEEP WHILE WATCHING TV: SLIGHT CHANCE OF DOZING
HOW LIKELY ARE YOU TO NOD OFF OR FALL ASLEEP WHILE SITTING AND TALKING TO SOMEONE: SLIGHT CHANCE OF DOZING
HOW LIKELY ARE YOU TO NOD OFF OR FALL ASLEEP IN A CAR, WHILE STOPPED FOR A FEW MINUTES IN TRAFFIC: WOULD NEVER DOZE
HOW LIKELY ARE YOU TO NOD OFF OR FALL ASLEEP WHILE SITTING QUIETLY AFTER LUNCH WITHOUT ALCOHOL: MODERATE CHANCE OF DOZING

## 2024-12-19 ASSESSMENT — ENCOUNTER SYMPTOMS
ALLERGIC/IMMUNOLOGIC NEGATIVE: 1
GASTROINTESTINAL NEGATIVE: 1
EYES NEGATIVE: 1
RESPIRATORY NEGATIVE: 1

## 2024-12-20 NOTE — PROGRESS NOTES
home  Work and kids will disrupt his sleep     Prev hpi  Struggled with CPAP use then got sick/vacation stopped using   Felt slept better without it  Hybrid mask was better than nasal  Sleep is restless, repositions often  Sleep schedule irregular due to work         Prev hpi:  F/u HSAT which showed moderate SUZAN  Kids in bed ~ 8:30p then doing chores around the house, unwinds in living room watching tv/phone/reading   To  bed ~ 10:30p  Up around 6:30a  ~1 arousal during the night, has improved lately  At work everyone will nap after lunch time   Has no issues falling to sleep at work but at home when gets into bed mind racing/brain won't turn off  Has been practicing stimulus control, noticed some improvement in sleep onset but often is not sleepy at bedtime if had taken nap that day, or if was up for 24h shift is very very tired early evening            Prev hpi  STOP-BANG:  Snore- yes   Tired- yes  Observed apneas/gasping/choking- yes  High blood pressure- no  BMI > 35kg/sq m - no  Age > 50 - no  Neck circumference > 40 cm - yes  Gender male - yes  Total score 5/8    Ref EP suspected SUZAN  Snores more when in recliner, wakes self up snoring/gasping  Difficulty going to sleep and staying asleep, insomnia since deployed in  [place]  Very tired but lays down and will  be wide awake   May wind down on phone but can take hours to fall asleep   Tried trazodone, was not effective   Currently taking buspar BID and cymbalta in AM  Works in fire dept, 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

## 2025-01-17 RX ORDER — DULOXETIN HYDROCHLORIDE 60 MG/1
60 CAPSULE, DELAYED RELEASE ORAL DAILY
Qty: 90 CAPSULE | Refills: 3 | Status: SHIPPED | OUTPATIENT
Start: 2025-01-17

## 2025-01-17 NOTE — TELEPHONE ENCOUNTER
Name of Medication(s) Requested:  Requested Prescriptions     Pending Prescriptions Disp Refills    DULoxetine (CYMBALTA) 60 MG extended release capsule [Pharmacy Med Name: DULOXETINE HCL DR 60 MG CAP] 90 capsule 3     Sig: Take 1 capsule by mouth daily       Medication is on current medication list Yes    Dosage and directions were verified? Yes    Quantity verified: 90 day supply     Pharmacy Verified?  Yes    Last Appointment:  7/23/2024    Future appts:  Future Appointments   Date Time Provider Department Center   3/10/2025  9:00 AM Reece Zapata MD [facility] DE[facility]        (If no appt send self scheduling link. .REFILLAPPT)  Scheduling request sent?     [x] Yes  [] No    Does patient need updated?  [] Yes  [x] No

## 2025-02-21 ENCOUNTER — HOSPITAL ENCOUNTER (EMERGENCY)
Age: 40
Discharge: HOME | End: 2025-02-21
Attending: STUDENT IN AN ORGANIZED HEALTH CARE EDUCATION/TRAINING PROGRAM
Payer: COMMERCIAL

## 2025-02-21 ENCOUNTER — APPOINTMENT (OUTPATIENT)
Dept: GENERAL RADIOLOGY | Age: 40
End: 2025-02-21
Payer: COMMERCIAL

## 2025-02-21 VITALS
DIASTOLIC BLOOD PRESSURE: 93 MMHG | OXYGEN SATURATION: 98 % | TEMPERATURE: 99.1 F | HEART RATE: 99 BPM | SYSTOLIC BLOOD PRESSURE: 154 MMHG | HEIGHT: 70 IN | RESPIRATION RATE: 18 BRPM | BODY MASS INDEX: 32.93 KG/M2 | WEIGHT: 230 LBS

## 2025-02-21 DIAGNOSIS — R10.13 ABDOMINAL PAIN, EPIGASTRIC: Primary | ICD-10-CM

## 2025-02-21 DIAGNOSIS — R11.2 NAUSEA AND VOMITING, UNSPECIFIED VOMITING TYPE: ICD-10-CM

## 2025-02-21 LAB
ALBUMIN SERPL-MCNC: 4.2 G/DL (ref 3.5–5.2)
ALP SERPL-CCNC: 104 U/L (ref 40–129)
ALT SERPL-CCNC: 45 U/L (ref 0–40)
ANION GAP SERPL CALCULATED.3IONS-SCNC: 9 MMOL/L (ref 7–16)
AST SERPL-CCNC: 27 U/L (ref 0–39)
BASOPHILS # BLD: 0.06 K/UL (ref 0–0.2)
BASOPHILS NFR BLD: 1 % (ref 0–2)
BILIRUB SERPL-MCNC: 0.4 MG/DL (ref 0–1.2)
BNP SERPL-MCNC: <36 PG/ML (ref 0–125)
BUN SERPL-MCNC: 17 MG/DL (ref 6–20)
CALCIUM SERPL-MCNC: 9.5 MG/DL (ref 8.6–10.2)
CHLORIDE SERPL-SCNC: 104 MMOL/L (ref 98–107)
CO2 SERPL-SCNC: 25 MMOL/L (ref 22–29)
CREAT SERPL-MCNC: 0.9 MG/DL (ref 0.7–1.2)
EKG ATRIAL RATE: 88 BPM
EKG P AXIS: 46 DEGREES
EKG P-R INTERVAL: 150 MS
EKG Q-T INTERVAL: 332 MS
EKG QRS DURATION: 80 MS
EKG QTC CALCULATION (BAZETT): 401 MS
EKG R AXIS: 31 DEGREES
EKG T AXIS: 39 DEGREES
EKG VENTRICULAR RATE: 88 BPM
EOSINOPHIL # BLD: 0.09 K/UL (ref 0.05–0.5)
EOSINOPHILS RELATIVE PERCENT: 1 % (ref 0–6)
ERYTHROCYTE [DISTWIDTH] IN BLOOD BY AUTOMATED COUNT: 12.2 % (ref 11.5–15)
GFR, ESTIMATED: >90 ML/MIN/1.73M2
GLUCOSE SERPL-MCNC: 99 MG/DL (ref 74–99)
HCT VFR BLD AUTO: 48.1 % (ref 37–54)
HGB BLD-MCNC: 16.2 G/DL (ref 12.5–16.5)
IMM GRANULOCYTES # BLD AUTO: 0.04 K/UL (ref 0–0.58)
IMM GRANULOCYTES NFR BLD: 0 % (ref 0–5)
INFLUENZA A BY PCR: NOT DETECTED
INFLUENZA B BY PCR: NOT DETECTED
INR PPP: 1.1
LYMPHOCYTES NFR BLD: 1.13 K/UL (ref 1.5–4)
LYMPHOCYTES RELATIVE PERCENT: 11 % (ref 20–42)
MCH RBC QN AUTO: 30.2 PG (ref 26–35)
MCHC RBC AUTO-ENTMCNC: 33.7 G/DL (ref 32–34.5)
MCV RBC AUTO: 89.6 FL (ref 80–99.9)
MONOCYTES NFR BLD: 0.7 K/UL (ref 0.1–0.95)
MONOCYTES NFR BLD: 7 % (ref 2–12)
NEUTROPHILS NFR BLD: 81 % (ref 43–80)
NEUTS SEG NFR BLD: 8.68 K/UL (ref 1.8–7.3)
PLATELET # BLD AUTO: 343 K/UL (ref 130–450)
PMV BLD AUTO: 9 FL (ref 7–12)
POTASSIUM SERPL-SCNC: 4.9 MMOL/L (ref 3.5–5)
PROT SERPL-MCNC: 8 G/DL (ref 6.4–8.3)
PROTHROMBIN TIME: 11.6 SEC (ref 9.3–12.4)
RBC # BLD AUTO: 5.37 M/UL (ref 3.8–5.8)
SARS-COV-2 RDRP RESP QL NAA+PROBE: NOT DETECTED
SODIUM SERPL-SCNC: 138 MMOL/L (ref 132–146)
SPECIMEN DESCRIPTION: NORMAL
TROPONIN I SERPL HS-MCNC: 7 NG/L (ref 0–11)
TROPONIN I SERPL HS-MCNC: 8 NG/L (ref 0–11)
WBC OTHER # BLD: 10.7 K/UL (ref 4.5–11.5)

## 2025-02-21 PROCEDURE — 96361 HYDRATE IV INFUSION ADD-ON: CPT

## 2025-02-21 PROCEDURE — 85025 COMPLETE CBC W/AUTO DIFF WBC: CPT

## 2025-02-21 PROCEDURE — 93010 ELECTROCARDIOGRAM REPORT: CPT | Performed by: INTERNAL MEDICINE

## 2025-02-21 PROCEDURE — 87502 INFLUENZA DNA AMP PROBE: CPT

## 2025-02-21 PROCEDURE — 2580000003 HC RX 258

## 2025-02-21 PROCEDURE — 93005 ELECTROCARDIOGRAM TRACING: CPT

## 2025-02-21 PROCEDURE — 6370000000 HC RX 637 (ALT 250 FOR IP)

## 2025-02-21 PROCEDURE — 83880 ASSAY OF NATRIURETIC PEPTIDE: CPT

## 2025-02-21 PROCEDURE — 84484 ASSAY OF TROPONIN QUANT: CPT

## 2025-02-21 PROCEDURE — 87635 SARS-COV-2 COVID-19 AMP PRB: CPT

## 2025-02-21 PROCEDURE — 99285 EMERGENCY DEPT VISIT HI MDM: CPT

## 2025-02-21 PROCEDURE — 6360000002 HC RX W HCPCS

## 2025-02-21 PROCEDURE — 71045 X-RAY EXAM CHEST 1 VIEW: CPT

## 2025-02-21 PROCEDURE — 96374 THER/PROPH/DIAG INJ IV PUSH: CPT

## 2025-02-21 PROCEDURE — 85610 PROTHROMBIN TIME: CPT

## 2025-02-21 PROCEDURE — 80053 COMPREHEN METABOLIC PANEL: CPT

## 2025-02-21 RX ORDER — 0.9 % SODIUM CHLORIDE 0.9 %
1000 INTRAVENOUS SOLUTION INTRAVENOUS ONCE
Status: COMPLETED | OUTPATIENT
Start: 2025-02-21 | End: 2025-02-21

## 2025-02-21 RX ORDER — ONDANSETRON 4 MG/1
4 TABLET, ORALLY DISINTEGRATING ORAL 3 TIMES DAILY PRN
Qty: 21 TABLET | Refills: 0 | Status: SHIPPED | OUTPATIENT
Start: 2025-02-21

## 2025-02-21 RX ORDER — PANTOPRAZOLE SODIUM 40 MG/10ML
40 INJECTION, POWDER, LYOPHILIZED, FOR SOLUTION INTRAVENOUS ONCE
Status: COMPLETED | OUTPATIENT
Start: 2025-02-21 | End: 2025-02-21

## 2025-02-21 RX ORDER — AZITHROMYCIN 250 MG/1
TABLET, FILM COATED ORAL
Qty: 1 PACKET | Refills: 0 | Status: SHIPPED | OUTPATIENT
Start: 2025-02-21 | End: 2025-02-25

## 2025-02-21 RX ADMIN — LIDOCAINE HYDROCHLORIDE: 20 SOLUTION ORAL at 10:51

## 2025-02-21 RX ADMIN — SODIUM CHLORIDE 1000 ML: 9 INJECTION, SOLUTION INTRAVENOUS at 10:49

## 2025-02-21 RX ADMIN — PANTOPRAZOLE SODIUM 40 MG: 40 INJECTION, POWDER, FOR SOLUTION INTRAVENOUS at 10:50

## 2025-02-21 NOTE — ED PROVIDER NOTES
[facility] EMERGENCY DEPARTMENT  EMERGENCY DEPARTMENT ENCOUNTER        Pt Name: Tuan Roy  MRN: [id number]  Birthdate 1985  Date of evaluation: 2/21/2025  Provider: Michelle Moreno DO  PCP: Reece Zapata MD  Note Started: 9:53 AM EST 2/21/25    CHIEF COMPLAINT       Chief Complaint   Patient presents with    Abdominal Pain     Thinks he may have stomach ulcer that is bleeding, fever.    Fever       HISTORY OF PRESENT ILLNESS: 1 or more Elements   Tuan Roy is a 40 y.o. male with a past medical history of gastroenteritis and GERD who presents to the emergency department with chief complaint of epigastric abdominal pain that is described as a burning sensation.  He states that this pain has been going on now for the better part of a day.  He has been feeling unwell but lately started feeling worse.  He has had associated nausea and vomiting for which he has been unable to take his normal GERD medications.  States that he feels the pain radiate up through his chest however does state that the pain in his chest also started just prior to the stomach pain.  It does feel like his normal GERD symptoms.  He is also endorsing a fever at home as well as some upper respiratory symptoms.  Patient is concerned that he may have a bleeding stomach ulcer.  States that he has had some dark appearing sputum.  Does work as a  however has not been in any burning building lately.  Patient is otherwise denying any lightheadedness, presyncope, diarrhea, lower extremity edema, hematemesis, hematochezia, or hematuria.     Nursing Notes were all reviewed and agreed with or any disagreements were addressed in the HPI.    REVIEW OF SYSTEMS :    Positives and Pertinent negatives as per HPI.    PAST MEDICAL HISTORY/Chronic Conditions Affecting Care    has a past medical history of SVT (supraventricular tachycardia) (09/29/2022).     SURGICAL HISTORY     Past Surgical History:   Procedure

## 2025-03-10 ENCOUNTER — OFFICE VISIT (OUTPATIENT)
Age: 40
End: 2025-03-10
Payer: COMMERCIAL

## 2025-03-10 VITALS
TEMPERATURE: 97.8 F | HEIGHT: 70 IN | WEIGHT: 231 LBS | SYSTOLIC BLOOD PRESSURE: 122 MMHG | BODY MASS INDEX: 33.07 KG/M2 | OXYGEN SATURATION: 96 % | RESPIRATION RATE: 18 BRPM | HEART RATE: 73 BPM | DIASTOLIC BLOOD PRESSURE: 86 MMHG

## 2025-03-10 DIAGNOSIS — K21.00 GASTROESOPHAGEAL REFLUX DISEASE WITH ESOPHAGITIS WITHOUT HEMORRHAGE: ICD-10-CM

## 2025-03-10 DIAGNOSIS — Z00.00 WELL ADULT EXAM: Primary | ICD-10-CM

## 2025-03-10 DIAGNOSIS — R53.83 OTHER FATIGUE: ICD-10-CM

## 2025-03-10 DIAGNOSIS — G47.33 OSA (OBSTRUCTIVE SLEEP APNEA): ICD-10-CM

## 2025-03-10 LAB
BILIRUBIN, POC: NEGATIVE
BLOOD URINE, POC: NEGATIVE
CLARITY, POC: CLEAR
COLOR, POC: NORMAL
GLUCOSE URINE, POC: NEGATIVE MG/DL
KETONES, POC: NEGATIVE MG/DL
LEUKOCYTE EST, POC: NEGATIVE
NITRITE, POC: NEGATIVE
PH, POC: 6
PROTEIN, POC: NEGATIVE MG/DL
SPECIFIC GRAVITY, POC: 1.03
UROBILINOGEN, POC: 0.2 MG/DL

## 2025-03-10 PROCEDURE — 99395 PREV VISIT EST AGE 18-39: CPT | Performed by: FAMILY MEDICINE

## 2025-03-10 PROCEDURE — 81002 URINALYSIS NONAUTO W/O SCOPE: CPT | Performed by: FAMILY MEDICINE

## 2025-03-10 SDOH — ECONOMIC STABILITY: FOOD INSECURITY: WITHIN THE PAST 12 MONTHS, THE FOOD YOU BOUGHT JUST DIDN'T LAST AND YOU DIDN'T HAVE MONEY TO GET MORE.: NEVER TRUE

## 2025-03-10 SDOH — ECONOMIC STABILITY: FOOD INSECURITY: WITHIN THE PAST 12 MONTHS, YOU WORRIED THAT YOUR FOOD WOULD RUN OUT BEFORE YOU GOT MONEY TO BUY MORE.: NEVER TRUE

## 2025-03-10 ASSESSMENT — PATIENT HEALTH QUESTIONNAIRE - PHQ9
SUM OF ALL RESPONSES TO PHQ QUESTIONS 1-9: 0
2. FEELING DOWN, DEPRESSED OR HOPELESS: NOT AT ALL
SUM OF ALL RESPONSES TO PHQ QUESTIONS 1-9: 0
1. LITTLE INTEREST OR PLEASURE IN DOING THINGS: NOT AT ALL

## 2025-03-10 ASSESSMENT — ENCOUNTER SYMPTOMS
GASTROINTESTINAL NEGATIVE: 1
RESPIRATORY NEGATIVE: 1

## 2025-03-10 NOTE — PROGRESS NOTES
Pupils are equal, round, and reactive to light.   Neck:      Vascular: No carotid bruit.   Cardiovascular:      Rate and Rhythm: Normal rate and regular rhythm.      Pulses: Normal pulses.      Heart sounds: Normal heart sounds. No murmur heard.  Pulmonary:      Effort: Pulmonary effort is normal.      Breath sounds: Normal breath sounds.   Abdominal:      General: Bowel sounds are normal.      Palpations: Abdomen is soft. There is no mass.      Tenderness: There is no abdominal tenderness.   Musculoskeletal:         General: Normal range of motion.      Cervical back: Normal range of motion and neck supple. No tenderness.      Right lower leg: No edema.      Left lower leg: No edema.   Lymphadenopathy:      Cervical: No cervical adenopathy.   Skin:     General: Skin is warm and dry.      Coloration: Skin is not jaundiced or pale.   Neurological:      General: No focal deficit present.      Mental Status: He is alert and oriented to person, place, and time. Mental status is at baseline.   Psychiatric:         Mood and Affect: Mood normal.         Behavior: Behavior normal.         Thought Content: Thought content normal.         Judgment: Judgment normal.             Personalized Preventive Plan  Current Health Maintenance Status  Immunization History   Administered Date(s) Administered    Anthrax (BioThrax) 05/14/2009, 07/31/2009, 08/10/2009, 03/12/2010, 09/13/2010, 09/22/2011, 10/11/2011    Hep A-Hep B, TWINRIX, (age 18y+), IM, 1mL 09/11/2008, 10/17/2008, 05/14/2009    Influenza A ([id number]) Vaccine IM 12/23/2009    Influenza Virus Vaccine 09/11/2008, 09/03/2009, 09/13/2010, 10/11/2011, 10/20/2012    Japanese Encephalitis Vaccine Sq 05/14/2009, 07/31/2009, 08/10/2009, 08/23/2009    Meningococcal ACWY, MENACTRA (MenACWY-D), (age 9m-55y), IM, 0.5mL 09/11/2008    Poliovirus, IPOL, (age 6w+), SC/IM, 0.5mL 10/17/2008    Smallpox ([id number]) 09/01/2011    TDaP, ADACEL (age 10y-64y), BOOSTRIX (age 10y+), IM, 0.5mL